# Patient Record
Sex: FEMALE | Race: WHITE | NOT HISPANIC OR LATINO
[De-identification: names, ages, dates, MRNs, and addresses within clinical notes are randomized per-mention and may not be internally consistent; named-entity substitution may affect disease eponyms.]

---

## 2021-10-14 ENCOUNTER — APPOINTMENT (OUTPATIENT)
Dept: OPHTHALMOLOGY | Facility: CLINIC | Age: 26
End: 2021-10-14
Payer: COMMERCIAL

## 2021-10-14 ENCOUNTER — NON-APPOINTMENT (OUTPATIENT)
Age: 26
End: 2021-10-14

## 2021-10-14 PROCEDURE — 92014 COMPRE OPH EXAM EST PT 1/>: CPT

## 2021-10-14 PROCEDURE — 99072 ADDL SUPL MATRL&STAF TM PHE: CPT

## 2022-05-06 ENCOUNTER — NON-APPOINTMENT (OUTPATIENT)
Age: 27
End: 2022-05-06

## 2022-05-07 PROBLEM — Z00.00 ENCOUNTER FOR PREVENTIVE HEALTH EXAMINATION: Status: ACTIVE | Noted: 2022-05-07

## 2023-02-09 ENCOUNTER — APPOINTMENT (OUTPATIENT)
Dept: PODIATRY | Facility: CLINIC | Age: 28
End: 2023-02-09
Payer: COMMERCIAL

## 2023-02-09 ENCOUNTER — FORM ENCOUNTER (OUTPATIENT)
Age: 28
End: 2023-02-09

## 2023-02-09 DIAGNOSIS — M79.89 OTHER SPECIFIED SOFT TISSUE DISORDERS: ICD-10-CM

## 2023-02-09 PROCEDURE — 99203 OFFICE O/P NEW LOW 30 MIN: CPT

## 2023-02-09 NOTE — PHYSICAL EXAM
[Left] : left foot and ankle [NL 30)] : inversion 30 degrees [NL (40)] : MTP joint DF 40 degrees [NL (20)] : MTP joint PF 20 degrees [5___] : [facility] 5[unfilled]/5 [2+] : posterior tibialis pulse: 2+ [Normal] : saphenous nerve sensation normal [] : non-antalgic [FreeTextEntry3] : GANGLION CYST LATERAL ASPECT OF ANKLE.

## 2023-02-09 NOTE — HISTORY OF PRESENT ILLNESS
[de-identified] : Patient presents today for evaluation of Left ankle pain. Patient states problem started in April 2022 when she went on a trip she noticed a bump on the side of her ankle. She states that she went to urgent care and was told that the bump was a cyst. The patient states since she noticed the bump it has decreased in size. Patient went to urgent care on 5/7/22 to have the bump evaluated. Patient was told to follow up with Dermatology however the patient states she did not.

## 2023-02-09 NOTE — ASSESSMENT
[FreeTextEntry1] : I AM ORDERING AN MRI. TO RULE OUT GANGLION OR LIPOMA. \par I DISCUSSED ASPIRATION BUT WILL WAIT. \par RTO AFTER MRI.

## 2023-03-13 ENCOUNTER — TRANSCRIPTION ENCOUNTER (OUTPATIENT)
Age: 28
End: 2023-03-13

## 2023-03-23 ENCOUNTER — APPOINTMENT (OUTPATIENT)
Dept: NEUROLOGY | Facility: CLINIC | Age: 28
End: 2023-03-23
Payer: COMMERCIAL

## 2023-03-23 VITALS
DIASTOLIC BLOOD PRESSURE: 89 MMHG | HEART RATE: 122 BPM | BODY MASS INDEX: 20.89 KG/M2 | SYSTOLIC BLOOD PRESSURE: 135 MMHG | HEIGHT: 66 IN | WEIGHT: 130 LBS

## 2023-03-23 DIAGNOSIS — Z80.52 FAMILY HISTORY OF MALIGNANT NEOPLASM OF BLADDER: ICD-10-CM

## 2023-03-23 DIAGNOSIS — R90.89 OTHER ABNORMAL FINDINGS ON DIAGNOSTIC IMAGING OF CENTRAL NERVOUS SYSTEM: ICD-10-CM

## 2023-03-23 DIAGNOSIS — F41.9 ANXIETY DISORDER, UNSPECIFIED: ICD-10-CM

## 2023-03-23 DIAGNOSIS — Z83.1 FAMILY HISTORY OF OTHER INFECTIOUS AND PARASITIC DISEASES: ICD-10-CM

## 2023-03-23 DIAGNOSIS — Z80.3 FAMILY HISTORY OF MALIGNANT NEOPLASM OF BREAST: ICD-10-CM

## 2023-03-23 DIAGNOSIS — Z80.1 FAMILY HISTORY OF MALIGNANT NEOPLASM OF TRACHEA, BRONCHUS AND LUNG: ICD-10-CM

## 2023-03-23 PROCEDURE — 99205 OFFICE O/P NEW HI 60 MIN: CPT

## 2023-03-23 RX ORDER — NORETHINDRONE ACETATE AND ETHINYL ESTRADIOL AND FERROUS FUMARATE 1MG-20(21)
1-20 KIT ORAL
Refills: 0 | Status: ACTIVE | COMMUNITY

## 2023-03-24 ENCOUNTER — LABORATORY RESULT (OUTPATIENT)
Age: 28
End: 2023-03-24

## 2023-03-24 PROBLEM — F41.9 ANXIETY: Status: ACTIVE | Noted: 2023-03-24

## 2023-03-24 RX ORDER — ALPRAZOLAM 0.5 MG/1
0.5 TABLET ORAL
Qty: 6 | Refills: 0 | Status: ACTIVE | COMMUNITY
Start: 2023-03-24 | End: 1900-01-01

## 2023-03-24 RX ORDER — CLONAZEPAM 0.5 MG/1
0.5 TABLET ORAL
Qty: 5 | Refills: 0 | Status: DISCONTINUED | COMMUNITY
Start: 2023-03-24 | End: 2023-03-24

## 2023-03-24 NOTE — ASSESSMENT
[FreeTextEntry1] : Assessment/Plan:\par  27 year old female with no significant past medical hx, recently developed new onset bilateral foot numbness which resolved in 2 weeks and work up revealed abnormal brain and spine imaging concerning for demyelinating process. I have personally reviewed MRI scans and agree that the findings appear characteristic for multiple sclerosis and meet DIS and DIT based on 2017 [employer] criteria for diagnosis of MS (ovoid, periventricular, juxtacortical, enhancing short segment cord lesion). She has an active cord lesion at T9, which most likely was cause of her symptoms. \par \par I discussed the pathophysiology of Multiple Sclerosis, its disease course and management. We discussed the different options for disease modifying therapy. I explained to her that the goal of treatment is to prevent any new clinical relapses, new lesions on MRI scans and to slow down disease progression/disability. In addition to discussing disease modifying therapies, we reviewed available therapies for symptomatic management for spasticity, neuropathic pain, bladder symptoms, fatigue etc. I also stressed on the importance of healthy eating habits, routine physical therapy and vitamin D supplementation.\par \par Multiple Sclerosis- will plan to order blood work up to rule out potential MS mimickers. Will also order preDMT labs. \par Plan: \par 1. Diagnostic Plan/Imaging: Will plan to repeat MRI brain, C/T spine w.w.o contrast 3 months after the start of DMT\par \par 2. Disease Modifying therapy plan: preDMT labs ordered\par \par 3. Symptomatic therapy plan:\par () Vitamin D3 and B12 supplementations \par \par \par \par Return to clinic 2-3 weeks, pt leaving for her honeymoon to [place] in 2 weeks. Okay to travel. \par \par The above plan was discussed with REANNA HOUSTON in great detail.  REANNA HOUSTON verbalized understanding and agrees with plan as detailed above. Patient was provided education and counselling on current diagnosis/symptoms. She was advised to call our clinic at [phone] for any new or worsening symptoms, or with any questions or concerns. In case of acute onset of neurological symptoms or worsening presentation, patient was advised to present to nearest emergency room for further evaluation. REANNA ROSEMARIE expressed understanding and all her questions/concerns were addressed.\par \par Nirali Talbert M.D\par

## 2023-03-24 NOTE — HISTORY OF PRESENT ILLNESS
[FreeTextEntry1] : HPI (initial visit Mar 23, 2023)- REANNA HOUSTON is a 27 year old woman referred for abnormal brain MRI and to rule out MS.\par \par She has been followed Dr. Santosh Rodriguez, neurologist. \par \par About 1 month ago (feb), flown back from [place], and woke up with tingling in feet, constant. Two weeks prior, she had an URI. She was seen at urgent care and seen at [facility], ruled out DVT, referred to neurology. Seen by sisters rheumatologist, had blood work up, took prednisone taper, symptoms have now resolved 100%. Saw Dr. Rodriguez 3/2/2023. NO prior hx of transient neurological symptoms. \par \par Labs by Rheumatologist, Dr. Slick Garay- B12 380, Vitamin D 47.2, C3 and C4 normal, PTH normal, thyroglobulin ab neg, quant TB neg, DsDNA neg, Beta 2 GP 1 aB (positive) and cardiolipin ab IGM 17.2 [H] - labs uploaded. Rheum plans on repeating abnormal labs in 3 months. \par \par Recent MRI imaging from [facility]-\par MRI brain w/w/o 3/8/2023- scattered ovoid supratentorial lesions, periventricular and juxtacortical, non enhancing. No definite CC lesion. \par MRI C spine w/w/o 3/17/2023- no cord lesions\par MRI T spine w/w/o 3/17/2023- central posterior cord lesion at T9, enhancing

## 2023-03-24 NOTE — PHYSICAL EXAM
[FreeTextEntry1] : PHYSICAL EXAM\par Constitutional: Alert, no acute distress \par Psychiatric: appropriate affect and mood\par Pulmonary: No respiratory distress, stable on room air\par \par NEUROLOGICAL EXAM\par Mental status: The patient is alert, attentive and conversational memory intact.\par Speech/language: No dysarthria\par Cranial nerves:\par CN II: Visual fields are full to confrontation. Pupil size equal and briskly reactive to light. \par CN III, IV, VI: EOMI, no nystagmus, no ptosis\par CN V: Facial sensation is intact to pinprick in all 3 divisions bilaterally.\par CN VII: Face is symmetric with normal eye closure and smile.\par CN VII: Hearing is normal to rubbing fingers\par CN IX, X: Palate elevates symmetrically. Phonation is normal.\par CN XI: Head turning and shoulder shrug are intact\par CN XII: Tongue is midline with normal movements and no atrophy.\par Motor: Strength is full bilaterally. 5/5 muscle power in bilateral UE and LE.\par Reflexes:                 R        L\par                  Biceps    3+       3+\par                  Patellar   3+       3+\par                  Achilles  4+       4+\par                  Plantar responses- R down\par                                                  L down\par Sensory:                          RUE/RLE         LUE/LLE\par                  light touch       +/+                     +/+\par                  Pinprick           + /+                     +/+\par                  Vibration         + /+                     +/+\par                  Joint PS          +/+                      +/+\par                  Temp              + /+                      +/+\par Coordination: There is no dysmetria on finger-to-nose and heel to shin. \par Gait/Stance: Posture is normal. Gait is steady with normal steps, base, arm swing, and turning. Tandem gait is normal. Romberg is negative.\par \par \par \par \par

## 2023-03-24 NOTE — DATA REVIEWED
[de-identified] : Recent MRI imaging from [facility]-\par MRI brain w/w/o 3/8/2023- scattered ovoid supratentorial lesions, periventricular and juxtacortical, non enhancing. No definite CC lesion. \par MRI C spine w/w/o 3/17/2023- no cord lesions\par MRI T spine w/w/o 3/17/2023- central posterior cord lesion at T9, enhancing

## 2023-03-27 LAB
ACE BLD-CCNC: 16 U/L
ALBUMIN SERPL ELPH-MCNC: 4.4 G/DL
ALP BLD-CCNC: 50 U/L
ALT SERPL-CCNC: 14 U/L
ANA PAT FLD IF-IMP: ABNORMAL
ANA SER IF-ACNC: ABNORMAL
AST SERPL-CCNC: 16 U/L
B BURGDOR AB SER-IMP: NEGATIVE
B BURGDOR IGG+IGM SER QL: 0.34 INDEX
BASOPHILS # BLD AUTO: 0.02 K/UL
BASOPHILS NFR BLD AUTO: 0.2 %
BILIRUB DIRECT SERPL-MCNC: 0.1 MG/DL
BILIRUB INDIRECT SERPL-MCNC: 0.2 MG/DL
BILIRUB SERPL-MCNC: 0.2 MG/DL
BUN SERPL-MCNC: 17 MG/DL
CREAT SERPL-MCNC: 0.89 MG/DL
CRP SERPL-MCNC: 6 MG/L
DEPRECATED KAPPA LC FREE/LAMBDA SER: 1.61 RATIO
DSDNA AB SER-ACNC: <12 IU/ML
EGFR: 91 ML/MIN/1.73M2
ENA RNP AB SER IA-ACNC: 0.2 AL
ENA SM AB SER IA-ACNC: <0.2 AL
ENA SS-A AB SER IA-ACNC: <0.2 AL
ENA SS-B AB SER IA-ACNC: <0.2 AL
EOSINOPHIL # BLD AUTO: 0.01 K/UL
EOSINOPHIL NFR BLD AUTO: 0.1 %
ERYTHROCYTE [SEDIMENTATION RATE] IN BLOOD BY WESTERGREN METHOD: 31 MM/HR
HBV CORE IGG+IGM SER QL: NONREACTIVE
HBV CORE IGM SER QL: NONREACTIVE
HBV SURFACE AB SER QL: REACTIVE
HBV SURFACE AG SER QL: NONREACTIVE
HCT VFR BLD CALC: 38.6 %
HGB BLD-MCNC: 12.7 G/DL
HIV1+2 AB SPEC QL IA.RAPID: NONREACTIVE
IGA SER QL IEP: 164 MG/DL
IGG SER QL IEP: 1239 MG/DL
IGM SER QL IEP: 238 MG/DL
IMM GRANULOCYTES NFR BLD AUTO: 0.3 %
KAPPA LC CSF-MCNC: 1.12 MG/DL
KAPPA LC SERPL-MCNC: 1.8 MG/DL
LYMPHOCYTES # BLD AUTO: 1.98 K/UL
LYMPHOCYTES NFR BLD AUTO: 22 %
MAN DIFF?: NORMAL
MCHC RBC-ENTMCNC: 29.9 PG
MCHC RBC-ENTMCNC: 32.9 GM/DL
MCV RBC AUTO: 90.8 FL
MONOCYTES # BLD AUTO: 0.46 K/UL
MONOCYTES NFR BLD AUTO: 5.1 %
NEUTROPHILS # BLD AUTO: 6.49 K/UL
NEUTROPHILS NFR BLD AUTO: 72.3 %
PLATELET # BLD AUTO: 305 K/UL
PROT SERPL-MCNC: 7.2 G/DL
RBC # BLD: 4.25 M/UL
RBC # FLD: 12.3 %
RHEUMATOID FACT SER QL: <10 IU/ML
T PALLIDUM AB SER QL IA: NEGATIVE
VZV AB TITR SER: POSITIVE
VZV IGG SER IF-ACNC: 475 INDEX
VZV IGM SER IF-ACNC: <0.91 INDEX
WBC # FLD AUTO: 8.99 K/UL

## 2023-03-28 LAB
B2 GLYCOPROT1 IGG SER-ACNC: <5 SGU
B2 GLYCOPROT1 IGM SER-ACNC: 10.9 SMU
CARDIOLIPIN IGM SER-MCNC: 12.9 MPL
CARDIOLIPIN IGM SER-MCNC: <5 GPL

## 2023-03-29 LAB
ANCA AB SER-IMP: NEGATIVE
C-ANCA SER-ACNC: NEGATIVE
M TB IFN-G BLD-IMP: NEGATIVE
P-ANCA TITR SER IF: NEGATIVE
QUANTIFERON TB PLUS MITOGEN MINUS NIL: >10 IU/ML
QUANTIFERON TB PLUS NIL: 0.01 IU/ML
QUANTIFERON TB PLUS TB1 MINUS NIL: 0 IU/ML
QUANTIFERON TB PLUS TB2 MINUS NIL: 0 IU/ML

## 2023-03-30 LAB
AQP4 H2O CHANNEL AB SERPL IA-ACNC: NEGATIVE
HTLV I+II AB SER QL: NORMAL
JCV INDEX: 0.18
MOG AB SER QL CBA IFA: NEGATIVE
STRATIFY JCV ANTIBODY: NEGATIVE

## 2023-04-01 ENCOUNTER — EMERGENCY (EMERGENCY)
Facility: HOSPITAL | Age: 28
LOS: 1 days | Discharge: HOME | End: 2023-04-01
Attending: EMERGENCY MEDICINE
Payer: COMMERCIAL

## 2023-04-01 VITALS
DIASTOLIC BLOOD PRESSURE: 73 MMHG | SYSTOLIC BLOOD PRESSURE: 106 MMHG | RESPIRATION RATE: 16 BRPM | HEART RATE: 88 BPM | TEMPERATURE: 99 F | OXYGEN SATURATION: 97 %

## 2023-04-01 VITALS
RESPIRATION RATE: 18 BRPM | WEIGHT: 130.07 LBS | HEART RATE: 122 BPM | DIASTOLIC BLOOD PRESSURE: 81 MMHG | SYSTOLIC BLOOD PRESSURE: 119 MMHG | TEMPERATURE: 98 F | OXYGEN SATURATION: 100 % | HEIGHT: 66 IN

## 2023-04-01 LAB
ALBUMIN SERPL ELPH-MCNC: 5.4 G/DL — HIGH (ref 3.3–5)
ALP SERPL-CCNC: 54 U/L — SIGNIFICANT CHANGE UP (ref 40–120)
ALT FLD-CCNC: 19 U/L — SIGNIFICANT CHANGE UP (ref 10–45)
AMPHET UR-MCNC: NEGATIVE — SIGNIFICANT CHANGE UP
ANION GAP SERPL CALC-SCNC: 12 MMOL/L — SIGNIFICANT CHANGE UP (ref 5–17)
APAP SERPL-MCNC: <15 UG/ML — SIGNIFICANT CHANGE UP (ref 10–30)
APPEARANCE UR: CLEAR — SIGNIFICANT CHANGE UP
APTT BLD: 29 SEC — SIGNIFICANT CHANGE UP (ref 27.5–35.5)
AST SERPL-CCNC: 17 U/L — SIGNIFICANT CHANGE UP (ref 10–40)
BACTERIA # UR AUTO: NEGATIVE — SIGNIFICANT CHANGE UP
BARBITURATES UR SCN-MCNC: NEGATIVE — SIGNIFICANT CHANGE UP
BASOPHILS # BLD AUTO: 0.01 K/UL — SIGNIFICANT CHANGE UP (ref 0–0.2)
BASOPHILS NFR BLD AUTO: 0.1 % — SIGNIFICANT CHANGE UP (ref 0–2)
BENZODIAZ UR-MCNC: NEGATIVE — SIGNIFICANT CHANGE UP
BILIRUB SERPL-MCNC: 0.4 MG/DL — SIGNIFICANT CHANGE UP (ref 0.2–1.2)
BILIRUB UR-MCNC: NEGATIVE — SIGNIFICANT CHANGE UP
BUN SERPL-MCNC: 9 MG/DL — SIGNIFICANT CHANGE UP (ref 7–23)
CALCIUM SERPL-MCNC: 10.4 MG/DL — SIGNIFICANT CHANGE UP (ref 8.4–10.5)
CHLORIDE SERPL-SCNC: 100 MMOL/L — SIGNIFICANT CHANGE UP (ref 96–108)
CO2 SERPL-SCNC: 24 MMOL/L — SIGNIFICANT CHANGE UP (ref 22–31)
COCAINE METAB.OTHER UR-MCNC: NEGATIVE — SIGNIFICANT CHANGE UP
COLOR SPEC: COLORLESS — SIGNIFICANT CHANGE UP
CREAT SERPL-MCNC: 0.82 MG/DL — SIGNIFICANT CHANGE UP (ref 0.5–1.3)
DIFF PNL FLD: ABNORMAL
EGFR: 100 ML/MIN/1.73M2 — SIGNIFICANT CHANGE UP
EOSINOPHIL # BLD AUTO: 0 K/UL — SIGNIFICANT CHANGE UP (ref 0–0.5)
EOSINOPHIL NFR BLD AUTO: 0 % — SIGNIFICANT CHANGE UP (ref 0–6)
EPI CELLS # UR: 1 /HPF — SIGNIFICANT CHANGE UP
ETHANOL SERPL-MCNC: <10 MG/DL — SIGNIFICANT CHANGE UP (ref 0–10)
FLUAV AG NPH QL: SIGNIFICANT CHANGE UP
FLUBV AG NPH QL: SIGNIFICANT CHANGE UP
GLUCOSE SERPL-MCNC: 92 MG/DL — SIGNIFICANT CHANGE UP (ref 70–99)
GLUCOSE UR QL: NEGATIVE — SIGNIFICANT CHANGE UP
HCG SERPL-ACNC: <2 MIU/ML — SIGNIFICANT CHANGE UP
HCT VFR BLD CALC: 45.8 % — HIGH (ref 34.5–45)
HGB BLD-MCNC: 15.4 G/DL — SIGNIFICANT CHANGE UP (ref 11.5–15.5)
HYALINE CASTS # UR AUTO: 1 /LPF — SIGNIFICANT CHANGE UP (ref 0–2)
IMM GRANULOCYTES NFR BLD AUTO: 0.3 % — SIGNIFICANT CHANGE UP (ref 0–0.9)
INR BLD: 1.03 RATIO — SIGNIFICANT CHANGE UP (ref 0.88–1.16)
KETONES UR-MCNC: NEGATIVE — SIGNIFICANT CHANGE UP
LEUKOCYTE ESTERASE UR-ACNC: NEGATIVE — SIGNIFICANT CHANGE UP
LYMPHOCYTES # BLD AUTO: 1.22 K/UL — SIGNIFICANT CHANGE UP (ref 1–3.3)
LYMPHOCYTES # BLD AUTO: 15.8 % — SIGNIFICANT CHANGE UP (ref 13–44)
MCHC RBC-ENTMCNC: 29.3 PG — SIGNIFICANT CHANGE UP (ref 27–34)
MCHC RBC-ENTMCNC: 33.6 GM/DL — SIGNIFICANT CHANGE UP (ref 32–36)
MCV RBC AUTO: 87.2 FL — SIGNIFICANT CHANGE UP (ref 80–100)
METHADONE UR-MCNC: NEGATIVE — SIGNIFICANT CHANGE UP
MONOCYTES # BLD AUTO: 0.25 K/UL — SIGNIFICANT CHANGE UP (ref 0–0.9)
MONOCYTES NFR BLD AUTO: 3.2 % — SIGNIFICANT CHANGE UP (ref 2–14)
NEUTROPHILS # BLD AUTO: 6.24 K/UL — SIGNIFICANT CHANGE UP (ref 1.8–7.4)
NEUTROPHILS NFR BLD AUTO: 80.6 % — HIGH (ref 43–77)
NITRITE UR-MCNC: NEGATIVE — SIGNIFICANT CHANGE UP
NRBC # BLD: 0 /100 WBCS — SIGNIFICANT CHANGE UP (ref 0–0)
OPIATES UR-MCNC: NEGATIVE — SIGNIFICANT CHANGE UP
OXYCODONE UR-MCNC: NEGATIVE — SIGNIFICANT CHANGE UP
PCP SPEC-MCNC: SIGNIFICANT CHANGE UP
PCP UR-MCNC: NEGATIVE — SIGNIFICANT CHANGE UP
PH UR: 6 — SIGNIFICANT CHANGE UP (ref 5–8)
PLATELET # BLD AUTO: 462 K/UL — HIGH (ref 150–400)
POTASSIUM SERPL-MCNC: 3.8 MMOL/L — SIGNIFICANT CHANGE UP (ref 3.5–5.3)
POTASSIUM SERPL-SCNC: 3.8 MMOL/L — SIGNIFICANT CHANGE UP (ref 3.5–5.3)
PROT SERPL-MCNC: 8.8 G/DL — HIGH (ref 6–8.3)
PROT UR-MCNC: NEGATIVE — SIGNIFICANT CHANGE UP
PROTHROM AB SERPL-ACNC: 12 SEC — SIGNIFICANT CHANGE UP (ref 10.5–13.4)
RBC # BLD: 5.25 M/UL — HIGH (ref 3.8–5.2)
RBC # FLD: 12 % — SIGNIFICANT CHANGE UP (ref 10.3–14.5)
RBC CASTS # UR COMP ASSIST: 1 /HPF — SIGNIFICANT CHANGE UP (ref 0–4)
RSV RNA NPH QL NAA+NON-PROBE: SIGNIFICANT CHANGE UP
SALICYLATES SERPL-MCNC: <2 MG/DL — LOW (ref 15–30)
SARS-COV-2 RNA SPEC QL NAA+PROBE: SIGNIFICANT CHANGE UP
SODIUM SERPL-SCNC: 136 MMOL/L — SIGNIFICANT CHANGE UP (ref 135–145)
SP GR SPEC: 1.01 — LOW (ref 1.01–1.02)
THC UR QL: NEGATIVE — SIGNIFICANT CHANGE UP
UROBILINOGEN FLD QL: NEGATIVE — SIGNIFICANT CHANGE UP
WBC # BLD: 7.74 K/UL — SIGNIFICANT CHANGE UP (ref 3.8–10.5)
WBC # FLD AUTO: 7.74 K/UL — SIGNIFICANT CHANGE UP (ref 3.8–10.5)
WBC UR QL: 1 /HPF — SIGNIFICANT CHANGE UP (ref 0–5)

## 2023-04-01 PROCEDURE — 99284 EMERGENCY DEPT VISIT MOD MDM: CPT | Mod: 25

## 2023-04-01 PROCEDURE — 36415 COLL VENOUS BLD VENIPUNCTURE: CPT

## 2023-04-01 PROCEDURE — 84702 CHORIONIC GONADOTROPIN TEST: CPT

## 2023-04-01 PROCEDURE — 80307 DRUG TEST PRSMV CHEM ANLYZR: CPT

## 2023-04-01 PROCEDURE — 85610 PROTHROMBIN TIME: CPT

## 2023-04-01 PROCEDURE — 85025 COMPLETE CBC W/AUTO DIFF WBC: CPT

## 2023-04-01 PROCEDURE — 93005 ELECTROCARDIOGRAM TRACING: CPT

## 2023-04-01 PROCEDURE — 80053 COMPREHEN METABOLIC PANEL: CPT

## 2023-04-01 PROCEDURE — 85730 THROMBOPLASTIN TIME PARTIAL: CPT

## 2023-04-01 PROCEDURE — 81001 URINALYSIS AUTO W/SCOPE: CPT

## 2023-04-01 PROCEDURE — 87637 SARSCOV2&INF A&B&RSV AMP PRB: CPT

## 2023-04-01 NOTE — ED PROVIDER NOTE - CARE PLAN
Assessment and plan of treatment:	see mdm   Principal Discharge DX:	Eye pressure  Assessment and plan of treatment:	see mdm  Secondary Diagnosis:	Blurred vision   1

## 2023-04-01 NOTE — ED PROVIDER NOTE - PROGRESS NOTE DETAILS
Neurology consulted and spoke with indicating patient is having migraine and is okay to be D/C. Will educate patient on use of advil, tyenol to control migraine's and thusly visual symptoms. ALso send home on steriod medication for 5 days and to f/u with outpatient Dr. Benavidez. Neurology consulted (added UA/UC/U-tox, serum tox.) and spoke with indicating patient is having migraine and is okay to be D/C. Will educate patient on use of advil, tyenol to control migraine's and thusly visual symptoms. ALso send home on steriod medication for 5 days and to f/u with outpatient Dr. Benavidez.

## 2023-04-01 NOTE — ED ADULT NURSE NOTE - OBJECTIVE STATEMENT
27y f pt with mother at bedside c/o blurry vision, eye pressure, dilated pupils and floaters that have resolved; states started wednesday; pt recently dx with ms; doctor that dx her told her to go to ed; had been on steroids that ended in early march; aox3; no resp distress; no sob; no chest pain; pt ambulating with steady gait on own without assist; pt speaking in full sentences; no abd pain; no n/v/d; denies fever/chills; no cough/congestion; iv placed; labs drawn/sent

## 2023-04-01 NOTE — ED PROVIDER NOTE - CARE PROVIDER_API CALL
Nirali Talbert)  Neurology  [address] [place] [address]  Phone: [phone]  Fax: [phone]  Follow Up Time:

## 2023-04-01 NOTE — ED PROVIDER NOTE - NS ED ROS FT
REVIEW OF SYSTEMS:    CONSTITUTIONAL: +headache, No weakness, fevers or chills  EYES/ENT: + bilateral eye pressure, blurry vision, floaters, pupil dilation no loss of vision or eye pain  NECK: No pain or stiffness  RESPIRATORY: No cough, wheezing, hemoptysis; No shortness of breath  CARDIOVASCULAR: No chest pain or palpitations  GASTROINTESTINAL: No abdominal or epigastric pain. No nausea, vomiting, or hematemesis; No diarrhea or constipation. No melena or hematochezia.  GENITOURINARY: No dysuria, frequency or hematuria  NEUROLOGICAL: No numbness or weakness  SKIN: No itching, rashes

## 2023-04-01 NOTE — ED PROVIDER NOTE - NSFOLLOWUPINSTRUCTIONS_ED_ALL_ED_FT
You were evaluated in the emergency department for visual changes. Your results were discussed with you and are attached. Please follow up with neurology Dr. Nirali Talbert on an outpatient basis. A steroid medication has been sent to your pharmacy - please pickup and take as directed. Also follow up with your primary care doctor within 1 week.    Rest, drink plenty of fluids.  Advance activity as tolerated.  Continue all previously prescribed medications as directed.  Follow up with your primary care physician in 48-72 hours- bring copies of your results.  Return to the ER for worsening or persistent symptoms, and/or ANY NEW OR CONCERNING SYMPTOMS. If you have issues obtaining follow up, please call: [phone] to obtain a doctor or specialist who takes your insurance in your area.

## 2023-04-01 NOTE — CONSULT NOTE ADULT - SUBJECTIVE AND OBJECTIVE BOX
Neurology - Consult Note    -  Spectra: [phone] ([facility]), [phone] ([facility])  -    HPI: Patient REANNA HOUSTON is a 27y (1995) woman with a PMHx significant for recent MS diagnosis presenting with visual sx followed by HA. On wednesday, patient saw black streaks and immediately had a bifrontal HA. Visual sx lasted for 5-10 min. HA was associated with nausea, and lasted for a couple of hours. Patient took advil and sat in a dark, cool room. Patient has not been formally dx with migraines but does get b/l frontal HA every month, and notices them to be more frequent before menstrual cycle. Of note, patient says she is due to get her menstrual cycle in one week. Patient says she wears contacts, but decided to wear glasses. Her glasses have the wrong prescription, which is lower, so after wearing her glasses, patient felt like she had b/l eye pressure. Denies weakness, numbness, dizziness, vision loss, blurry vision, eye pain, color distortions. On OCP for a while. Recently completed steroid taper per rheumatology.     Of note, patient saw Dr. Talbert on 3/24/23. Patient presented initially with tingling in feet that resolved in 2 weeks. Below imaging, revealed c/f demyelinating process suggestive of MS. Her T9 cord lesion was likely cause of her tingling in the feet. The plan was to repeat neuroimaging 3 months after the start of DMT. Patient currently not on DMT.     MRI brain w/w/o 3/8/2023- scattered ovoid supratentorial lesions, periventricular and juxtacortical, non enhancing. No definite CC lesion.   MRI C spine w/w/o 3/17/2023- no cord lesions  MRI T spine w/w/o 3/17/2023- central posterior cord lesion at T9, enhancing     Patient also had labs drawn by rheumatology which were s/f positive Beta 2 GP 1 ab and cardiolipin ab IgM. Rheumatology planned to repeat these labs in 3 months and gave the patient the above steroid taper.       Review of Systems:    All other review of systems is negative unless indicated above.    Allergies:  No Known Allergies      PMHx/PSHx/Family Hx: As above, otherwise see below   Multiple sclerosis        Social Hx:  No current use of tobacco, alcohol, or illicit drugs    Medications:  MEDICATIONS  (STANDING):    MEDICATIONS  (PRN):      Vitals:  T(C): 37.1 (04-01-23 @ 14:00), Max: 37.1 (04-01-23 @ 14:00)  HR: 88 (04-01-23 @ 14:00) (88 - 122)  BP: 106/73 (04-01-23 @ 14:00) (106/73 - 119/81)  RR: 16 (04-01-23 @ 14:00) (16 - 18)  SpO2: 97% (04-01-23 @ 14:00) (97% - 100%)    Physical Examination:   General - NAD  Cardiovascular - Peripheral pulses palpable, no edema  Eyes - Fundoscopy not performed due to safety precautions in the setting of the COVID-19 pandemic    Neurologic Exam:  Mental status - Awake, Alert, Oriented to person, place, and time. Speech fluent, repetition and naming intact. Follows simple and complex commands. Attention/concentration, recent and remote memory (including registration and recall), and fund of knowledge intact    Cranial nerves - PERRLA, VFF, EOMI, face sensation (V1-V3) intact b/l, facial strength intact without asymmetry b/l, hearing intact b/l, palate with symmetric elevation, trapezius 5/5 strength b/l, tongue midline on protrusion with full lateral movement    Motor - Normal bulk and tone throughout. No pronator drift.  Strength testing            Deltoid      Biceps      Triceps     Wrist Extension    Wrist Flexion     Interossei         R            5                 5               5                     5                              5                        5                 5  L             5                 5               5                     5                              5                        5                 5              Hip Flexion    Hip Extension    Knee Flexion    Knee Extension    Dorsiflexion    Plantar Flexion  R              5                           5                       5                           5                            5                          5  L              5                           5                        5                           5                            5                          5    Sensation - Light touch/temperature intact throughout    DTR's -             Biceps      Triceps     Brachioradialis      Patellar    Ankle    Toes/plantar response  R             3+             3+                  3+              3+            3+                 Down  L              3+             3+                 3+               3+           3+                 Down    Coordination - Finger to Nose intact b/l. No tremors appreciated    Gait and station - Normal casual gait.     Labs:          CAPILLARY BLOOD GLUCOSE              CSF:                  Radiology:

## 2023-04-01 NOTE — ED PROVIDER NOTE - OBJECTIVE STATEMENT
26yo F with PMH of recent MS diagnosis presenting to the ED with acute blurry vision and eye pressure bilaterally without pain for 1 day. Patient notes on Wed she noticed floaters in her vision, "black spots" alongside uilateral headache. Symptoms self resolved but on friday she began having blurry vision bilaterally, felt an increase in eye pressure, and indicates her pupils are dilated but denies pressure, trauma, N/V, vison loss, current headache. 26yo F with PMH of recent MS diagnosis presenting to the ED with acute blurry vision and eye pressure bilaterally without pain for 1 day. Patient notes on Wed she noticed floaters in her vision, "black spots" alongside unilateral headache. Symptoms self resolved but on Friday she began having blurry vision bilaterally, felt an increase in eye pressure, and indicates her pupils are dilated but denies pressure, trauma, N/V, vison loss, current headache.

## 2023-04-01 NOTE — ED PROVIDER NOTE - PHYSICAL EXAMINATION
VITALS:   T(C): 36.7 (04-01-23 @ 11:48), Max: 36.7 (04-01-23 @ 11:48)  HR: 122 (04-01-23 @ 11:48) (122 - 122)  BP: 119/81 (04-01-23 @ 11:48) (119/81 - 119/81)  RR: 18 (04-01-23 @ 11:48) (18 - 18)  SpO2: 100% (04-01-23 @ 11:48) (100% - 100%)    GENERAL: NAD, lying in bed comfortably  HEAD:  Atraumatic, Normocephalic  EYES: EOMI, PERRLA, conjunctiva and sclera clear, tonometry 19 measured bilaterally, visual acuity 20/20 bilaterally  ENT: Moist mucous membranes  NECK: Supple, No JVD  CHEST/LUNG: Clear to auscultation bilaterally; No rales, rhonchi, wheezing, or rubs. Unlabored respirations  HEART: Regular rate and rhythm; No murmurs. no lower extremity edema  ABDOMEN: BSx4; Soft, nontender, nondistended  EXTREMITIES:  2+ radial pulses, 2+ dorsalis pedis, brisk capillary refill. No clubbing, cyanosis, or edema  NERVOUS SYSTEM:  A&Ox3, no gross lateralizing deficits, bilateral normal sensation, CN2-12 intact, bilateral upper and lower extremity strength 5/5  SKIN: No rashes or lesions

## 2023-04-01 NOTE — CONSULT NOTE ADULT - ASSESSMENT
REANNA HOUSTON is a 27y (1995) woman with a PMHx significant for recent MS diagnosis presenting with visual sx followed by HA. On wednesday, patient saw black streaks and immediately had a bifrontal HA. Visual sx lasted for 5-10 min. HA was associated with nausea, and lasted for a couple of hours. Patient took advil and sat in a dark, cool room. Patient has not been formally dx with migraines but does get b/l frontal HA every month, and notices them to be more frequent before menstrual cycle. Of note, patient says she is due to get her menstrual cycle in one week. Patient says she wears contacts, but decided to wear glasses. Her glasses have the wrong prescription, which is lower, so after wearing her glasses, patient felt like she had b/l eye pressure. Denies weakness, numbness, dizziness, vision loss, blurry vision, eye pain, color distortions. On OCP for a while. Recently completed steroid taper per rheumatology.     Of note, patient saw Dr. Talbert on 3/24/23. Patient presented initially with tingling in feet that resolved in 2 weeks. Below imaging, revealed c/f demyelinating process suggestive of MS. Her T9 cord lesion was likely cause of her tingling in the feet. The plan was to repeat neuroimaging 3 months after the start of DMT. Patient currently not on DMT.     MRI brain w/w/o 3/8/2023- scattered ovoid supratentorial lesions, periventricular and juxtacortical, non enhancing. No definite CC lesion.   MRI C spine w/w/o 3/17/2023- no cord lesions  MRI T spine w/w/o 3/17/2023- central posterior cord lesion at T9, enhancing     Patient also had labs drawn by rheumatology which were s/f positive Beta 2 GP 1 ab and cardiolipin ab IgM. Rheumatology planned to repeat these labs in 3 months and gave the patient the above steroid taper.     Impression: Visual distortions followed by HA likely migraine with aura. Less likely MS flare given nonfocal symptoms and symptom presentation. Currently, patient denies all sx including HA.    Recommendations:   []No neurological contraindications to discharge  []HA control with Advil, Tylenol, or Excedrin  []If HA sx not controlled, would give patient 4mg Medrol dose pack for second line HA management   []F/u with Dr. Nirali Talbert outpatient.     Case d/w Dr. Nirali Talbert.

## 2023-04-01 NOTE — ED PROVIDER NOTE - PATIENT PORTAL LINK FT
You can access the FollowMyHealth Patient Portal offered by [facility] by registering at the following website: [email or web address]. By joining [employer]’s FollowMyHealth portal, you will also be able to view your health information using other applications (apps) compatible with our system.

## 2023-04-01 NOTE — CONSULT NOTE ADULT - NSCONSULTADDITIONALINFOA_GEN_ALL_CORE
ATTENDING ATTESTATION:    CASE WAS DISCUSSED WITH ME OVER THE PHONE. I DID NOT SEE PATIENT IN ER.  PT IS KNOWN TO ME- RECENTLY DIAGNOSED WITH MS.   I AGREE WITH ABOVE ASSESSMENT AND PLAN- PRESENTATION MOST LIKELY CONSISTENT WITH MIGRAINE AURA. MS RELAPSE LESS LIKELY.

## 2023-04-01 NOTE — ED PROVIDER NOTE - CLINICAL SUMMARY MEDICAL DECISION MAKING FREE TEXT BOX
28yo F with PMH of recent MS diagnosis presenting to the ED with acute blurry vision and eye pressure bilaterally without pain for 1 day. Patient without pain, transient floaters, dilated pupils, and blurry vision bilaterally. VSS outside tachycardia. Physical exam including neuro wnl, tonometry 19 bilaterally, acuity 20/20 bilaterally.     diff: most likely MS relapse however unusually based on patient bilateral nature of history. Considered glaucoma but in setting of normal tonometry less likely. Considered retinal detachment however based on bilateral nature also unusual. However in setting of potential retina detachment.    plan: CBC,CMP, neuro and optho consult with likely MRI. 26yo F with PMH of recent MS diagnosis presenting to the ED with acute blurry vision and eye pressure bilaterally without pain for 1 day. Patient without pain, transient floaters, dilated pupils, and blurry vision bilaterally. VSS outside tachycardia. Physical exam including neuro wnl, tonometry 19 bilaterally, acuity 20/20 bilaterally.     diff: most likely MS relapse however unusually based on patient bilateral nature of history. Considered glaucoma but in setting of normal tonometry less likely. Considered retinal detachment however based on bilateral nature also unusual. However in setting of potential retina detachment.    plan: CBC,CMP, neuro consult with likely MRI. SPoke with neurology added UA/UC/U-tox, serum tox. 26yo F with PMH of recent MS diagnosis presenting to the ED with acute blurry vision and eye pressure bilaterally without pain for 1 day. Patient without pain, transient floaters, dilated pupils, and blurry vision bilaterally. VSS outside tachycardia. Physical exam including neuro wnl, tonometry 19 bilaterally, acuity 20/20 bilaterally.     diff: most likely MS relapse however unusual based on patient bilateral nature of history. Considered glaucoma but in setting of normal tonometry less likely. Considered retinal detachment however based on bilateral nature also unusual and no curtain pattern or otherwise vision loss.     plan: CBC,CMP, neuro consult

## 2023-04-01 NOTE — ED PROVIDER NOTE - ATTENDING CONTRIBUTION TO CARE
Attending MD Harrison:  I personally have seen and examined this patient. I have performed a substantive portion of the visit including all aspects of the medical decision making.  Resident note reviewed and agree on plan of care and except where noted.          27-year-old woman with a history of recent MS diagnosis presenting with intermittent blurred vision of bilateral eyes as well as pressure behind both eyes.  No vision loss.  Denies any extremity paresthesias or weakness.  The patient was previously on a steroid course and no longer is because she completed the course.  The patient has not started a controller medication for her MS as of yet because she is to have a discussion with her neurologist about this.    Vital signs are nonactionable.  Patient sitting in the stretcher well-appearing in no distress.  Awake and alert. Symmetric eyebrow raise, symmetric eyelid closure. PERRL b/l, EOMI b/l, symmetric smile, tongue midline.  5/5  strength bilaterally, 5/5 elbow flexion bilaterally, 5/5 elbow extension bilaterally, 5/5 shoulder shrug b/l.  5/5 plantar and dorsiflexion b/l, 5/5 knee flexion and extension b/l, 5/5 hip flexion and extension b/l. Sensation intact and symmetric grossly to light touch throughout face and bilateral upper and lower extremities,  Finger to nose normal bilaterally. Steady gait.  Visual fields are grossly intact to confrontational testing bilaterally    Patient presenting with bilateral eye pressure and intermittent blurred vision.  Differential diagnosis includes but is not limited to possible optic neuritis related to MS diagnosis MS plaques in the occipital lobe.  She has a nonfocal neurologic exam at this time so urgent CT neuroimaging would be of little value at this time.  We will consult neurology to see if MS flare is to be considered and how to proceed if that is in the differential.        *The above represents an initial assessment/impression. Please refer to progress notes for potential changes in patient clinical course*

## 2023-04-04 ENCOUNTER — NON-APPOINTMENT (OUTPATIENT)
Age: 28
End: 2023-04-04

## 2023-04-04 ENCOUNTER — INPATIENT (INPATIENT)
Facility: HOSPITAL | Age: 28
LOS: 3 days | Discharge: HOME | DRG: 60 | End: 2023-04-08
Attending: PSYCHIATRY & NEUROLOGY | Admitting: PSYCHIATRY & NEUROLOGY
Payer: COMMERCIAL

## 2023-04-04 VITALS
HEIGHT: 66 IN | RESPIRATION RATE: 16 BRPM | DIASTOLIC BLOOD PRESSURE: 83 MMHG | HEART RATE: 93 BPM | WEIGHT: 130.07 LBS | OXYGEN SATURATION: 95 % | TEMPERATURE: 99 F | SYSTOLIC BLOOD PRESSURE: 120 MMHG

## 2023-04-04 LAB
ALBUMIN SERPL ELPH-MCNC: 4.9 G/DL — SIGNIFICANT CHANGE UP (ref 3.3–5)
ALP SERPL-CCNC: 47 U/L — SIGNIFICANT CHANGE UP (ref 40–120)
ALT FLD-CCNC: 14 U/L — SIGNIFICANT CHANGE UP (ref 10–45)
ANION GAP SERPL CALC-SCNC: 14 MMOL/L — SIGNIFICANT CHANGE UP (ref 5–17)
AST SERPL-CCNC: 15 U/L — SIGNIFICANT CHANGE UP (ref 10–40)
BASOPHILS # BLD AUTO: 0.01 K/UL — SIGNIFICANT CHANGE UP (ref 0–0.2)
BASOPHILS NFR BLD AUTO: 0.1 % — SIGNIFICANT CHANGE UP (ref 0–2)
BILIRUB SERPL-MCNC: 0.3 MG/DL — SIGNIFICANT CHANGE UP (ref 0.2–1.2)
BUN SERPL-MCNC: 10 MG/DL — SIGNIFICANT CHANGE UP (ref 7–23)
CALCIUM SERPL-MCNC: 9.8 MG/DL — SIGNIFICANT CHANGE UP (ref 8.4–10.5)
CHLORIDE SERPL-SCNC: 100 MMOL/L — SIGNIFICANT CHANGE UP (ref 96–108)
CO2 SERPL-SCNC: 25 MMOL/L — SIGNIFICANT CHANGE UP (ref 22–31)
CREAT SERPL-MCNC: 0.81 MG/DL — SIGNIFICANT CHANGE UP (ref 0.5–1.3)
EGFR: 102 ML/MIN/1.73M2 — SIGNIFICANT CHANGE UP
EOSINOPHIL # BLD AUTO: 0 K/UL — SIGNIFICANT CHANGE UP (ref 0–0.5)
EOSINOPHIL NFR BLD AUTO: 0 % — SIGNIFICANT CHANGE UP (ref 0–6)
FLUAV AG NPH QL: SIGNIFICANT CHANGE UP
FLUBV AG NPH QL: SIGNIFICANT CHANGE UP
GLUCOSE SERPL-MCNC: 94 MG/DL — SIGNIFICANT CHANGE UP (ref 70–99)
HCT VFR BLD CALC: 43.7 % — SIGNIFICANT CHANGE UP (ref 34.5–45)
HGB BLD-MCNC: 14.8 G/DL — SIGNIFICANT CHANGE UP (ref 11.5–15.5)
IMM GRANULOCYTES NFR BLD AUTO: 0.5 % — SIGNIFICANT CHANGE UP (ref 0–0.9)
LYMPHOCYTES # BLD AUTO: 2.79 K/UL — SIGNIFICANT CHANGE UP (ref 1–3.3)
LYMPHOCYTES # BLD AUTO: 24.7 % — SIGNIFICANT CHANGE UP (ref 13–44)
MCHC RBC-ENTMCNC: 30 PG — SIGNIFICANT CHANGE UP (ref 27–34)
MCHC RBC-ENTMCNC: 33.9 GM/DL — SIGNIFICANT CHANGE UP (ref 32–36)
MCV RBC AUTO: 88.6 FL — SIGNIFICANT CHANGE UP (ref 80–100)
MONOCYTES # BLD AUTO: 0.55 K/UL — SIGNIFICANT CHANGE UP (ref 0–0.9)
MONOCYTES NFR BLD AUTO: 4.9 % — SIGNIFICANT CHANGE UP (ref 2–14)
NEUTROPHILS # BLD AUTO: 7.88 K/UL — HIGH (ref 1.8–7.4)
NEUTROPHILS NFR BLD AUTO: 69.8 % — SIGNIFICANT CHANGE UP (ref 43–77)
NRBC # BLD: 0 /100 WBCS — SIGNIFICANT CHANGE UP (ref 0–0)
PLATELET # BLD AUTO: 483 K/UL — HIGH (ref 150–400)
POTASSIUM SERPL-MCNC: 3.5 MMOL/L — SIGNIFICANT CHANGE UP (ref 3.5–5.3)
POTASSIUM SERPL-SCNC: 3.5 MMOL/L — SIGNIFICANT CHANGE UP (ref 3.5–5.3)
PROT SERPL-MCNC: 8.1 G/DL — SIGNIFICANT CHANGE UP (ref 6–8.3)
RBC # BLD: 4.93 M/UL — SIGNIFICANT CHANGE UP (ref 3.8–5.2)
RBC # FLD: 12.2 % — SIGNIFICANT CHANGE UP (ref 10.3–14.5)
RSV RNA NPH QL NAA+NON-PROBE: SIGNIFICANT CHANGE UP
SARS-COV-2 RNA SPEC QL NAA+PROBE: SIGNIFICANT CHANGE UP
SODIUM SERPL-SCNC: 139 MMOL/L — SIGNIFICANT CHANGE UP (ref 135–145)
WBC # BLD: 11.29 K/UL — HIGH (ref 3.8–10.5)
WBC # FLD AUTO: 11.29 K/UL — HIGH (ref 3.8–10.5)

## 2023-04-04 RX ORDER — SODIUM CHLORIDE 9 MG/ML
1000 INJECTION INTRAMUSCULAR; INTRAVENOUS; SUBCUTANEOUS ONCE
Refills: 0 | Status: COMPLETED | OUTPATIENT
Start: 2023-04-04 | End: 2023-04-04

## 2023-04-04 RX ADMIN — SODIUM CHLORIDE 1000 MILLILITER(S): 9 INJECTION INTRAMUSCULAR; INTRAVENOUS; SUBCUTANEOUS at 18:30

## 2023-04-04 NOTE — ED PROVIDER NOTE - ATTENDING CONTRIBUTION TO CARE
RGUJRAL 27-year-old female with recent diagnosis of multiple sclerosis about a month ago recent admission to the ER for blurry vision on Saturday now returns with persistent symptoms of blurred vision and right lower extremity weakness since Sunday.  Patient states she feels pressure in both of her eyes was treated for optic migraine on Saturday and discharged home.  Patient has had persistent symptoms and now with right lower extremity weakness. She spoke to her neurologist Dr. Nirali Banegas who advised her to come in today.  Patient denies any headache nausea vomiting.  No numbness or tingling or change in bladder or bowel.  On exam patient is well-appearing no acute distress.  Normocephalic atraumatic pupils equal round and reactive to light.  No APD noted.  Lungs clear to auscultation positive S1-S2.  Abdomen soft nontender.  No midline T or L-spine tender to palpation.  Right lower extremity 5 out of 5 left lower extremity 5 out of 5 strength.  On gait assessment patient noted to have right foot dragging.  Will obtain labs neurology consult and ophthalmology consult to evaluate for MS flare and optic neuritis.

## 2023-04-04 NOTE — ED CDU PROVIDER INITIAL DAY NOTE - NS ED ATTENDING STATEMENT MOD
This was a shared visit with the AFSHIN. I reviewed and verified the documentation and independently performed the documented:

## 2023-04-04 NOTE — ED CDU PROVIDER INITIAL DAY NOTE - ATTENDING APP SHARED VISIT CONTRIBUTION OF CARE
RGUJRAL 27-year-old female with recent diagnosis of multiple sclerosis about a month ago recent admission to the ER for blurry vision on Saturday now returns with persistent symptoms of blurred vision and right lower extremity weakness since Sunday.  Patient states she feels pressure in both of her eyes was treated for optic migraine on Saturday and discharged home.  Patient has had persistent symptoms and now with right lower extremity weakness. She spoke to her neurologist Dr. Nirali Banegas who advised her to come in today.  Patient denies any headache nausea vomiting.  No numbness or tingling or change in bladder or bowel.  On exam patient is well-appearing no acute distress.  Normocephalic atraumatic pupils equal round and reactive to light.  No APD noted.  Lungs clear to auscultation positive S1-S2.  Abdomen soft nontender.  No midline T or L-spine tender to palpation.  Right lower extremity 5 out of 5 left lower extremity 5 out of 5 strength.  On gait assessment patient noted to have right foot dragging.  Results and Neurology consult appreciated. Admit to CDU at this time for monitoring and MRI. No IV steroids at this time. Ophthalmology consult pending to be seen in CDU.

## 2023-04-04 NOTE — ED CDU PROVIDER INITIAL DAY NOTE - OBJECTIVE STATEMENT
26 yo F with PMH of MS presenting with c/o worsening weakness and persistent eye pain and HA. PT follows with Dr. Talbert and was told to come to the ED. She is not currently on any medications. Pt denies nausea, vomiting, diarrhea, CP, dyspnea, cough, fever, chills, abd pain. Pt has a concern of a new foot drag. 28 yo F PMHx significant for recently diagnosed multiple sclerosis presents to the ED with visual blurriness and right sided weakness and gait instability. She follows with Dr. Nirali Talbert who recently diagnosed her with multiple sclerosis in 3/23/2023. [facility], " She has been followed Dr. Santosh Rodriguez, neurologist. About 1 month ago (feb), flown back from [place], and woke up with tingling in feet, constant. Two weeks prior, she had an URI. She was seen at urgent care and seen at [facility], ruled out DVT, referred to neurology. Seen by sisters rheumatologist, had blood work up, took prednisone taper, symptoms have now resolved 100%. Saw Dr. Rodriguez 3/2/2023. NO prior hx of transient neurological symptoms.  Recent MRI imaging from [facility]- MRI brain w/w/o 3/8/2023- scattered ovoid supratentorial lesions, periventricular and juxtacortical, non enhancing. No definite CC lesion. MRI C spine w/w/o 3/17/2023- no cord lesions. MRI T spine w/w/o 3/17/2023- central posterior cord lesion at T9, enhancing. Patient was planned for DMT after her honeymoon on 4/7 but patient presented to the hospital on 4/1 complaining of ocular blurriness and bifrontal headaches. She described it as her eyeballs being squeezed together. She was diagnosed with migraines w/ aura and less likely MS flare given nonfocal symptoms. She was provided a medrol pack and sent home. On 4/2, the patient was noticing difficulties with writing in her right hand and with right leg weakness and gait instability. No Lhermitte sign noted. Given persistence of symptoms, the patient's family called Dr. Talbert who recommended the patient come to the ED. Neurology consulted for blurry vision and right sided weakness. 26 yo F PMHx significant for recently diagnosed multiple sclerosis presents to the ED with visual blurriness and right sided weakness and gait instability. She follows with Dr. Nirali Talbert who recently diagnosed her with multiple sclerosis in 3/23/2023. Pt complaining of ocular blurriness and bifrontal headaches described it as her eyeballs being squeezed together. She was evaluated on 04/01 and diagnosed with migraines w/ aura and less likely MS flare given nonfocal symptoms. She was provided a medrol pack and sent home. On 4/2, the patient was noticing difficulties with writing in her right hand and with right leg weakness and gait instability. Given persistence of symptoms, the patient's family called Dr. Talbert who recommended the patient come to the ED.   In ED, patient had non actionable labs. Pt evaluated by Neurology who recommended CDU for MRI w/wo of orbits, brain, and cervical spine. For multiple sclerosis protocol.

## 2023-04-04 NOTE — ED CDU PROVIDER DISPOSITION NOTE - ATTENDING APP SHARED VISIT CONTRIBUTION OF CARE
Attending Note -- Patient seen and evaluated in CDU.  Labs/imaging reviewed.  Neuro recs appreciated.  Patient to be admitted to neuro for MS flare with active lesions on MR.  Tx per neuro, will c/t monitor.  --BMM

## 2023-04-04 NOTE — ED PROVIDER NOTE - PROGRESS NOTE DETAILS
Cristian, PGY-2, EM: neuro requested MRI, they think this is unlikely related to her MS. requested ophtho be on board. Cristian, PGY-2, EM: Ophtho consulted, they will come evaluate the pt in the ED. CDU aware of patient. unsure of they have beds currently, requested a call back to see after sign out. Neuro recs appreciated, discussed case with Neuro resident who states he spoke to Dr. Benavidez and does not recommend started IV steroids at this time and recommend CDU for MRI. Will consult optho to eval for optic neuritis. [email or web address]

## 2023-04-04 NOTE — ED ADULT NURSE NOTE - OBJECTIVE STATEMENT
27 year old female comes with Mom at bedside  For worsening symptoms that she was seen on Friday Pt states she has B/L Pressure In her eyes not getting better and right leg weakness Pt new dx of MS and sees a neurologist who told her to come back to the ED Pt was offered to stay on Friday for an MRI

## 2023-04-04 NOTE — ED CDU PROVIDER DISPOSITION NOTE - CLINICAL COURSE
26 yo F PMHx significant for recently diagnosed multiple sclerosis presents to the ED with visual blurriness and right sided weakness and gait instability. She follows with Dr. Nirali Talbert who recently diagnosed her with multiple sclerosis in 3/23/2023. Pt complaining of ocular blurriness and bifrontal headaches described it as her eyeballs being squeezed together. She was evaluated on 04/01 and diagnosed with migraines w/ aura and less likely MS flare given nonfocal symptoms. She was provided a medrol pack and sent home. On 4/2, the patient was noticing difficulties with writing in her right hand and with right leg weakness and gait instability. Given persistence of symptoms, the patient's family called Dr. Talbert who recommended the patient come to the ED.   In ED, patient had non actionable labs. Pt evaluated by Neurology who recommended CDU for MRI w/wo of orbits, brain, and cervical spine. For multiple sclerosis protocol. 28 yo F PMHx significant for recently diagnosed multiple sclerosis presents to the ED with visual blurriness and right sided weakness and gait instability. She follows with Dr. Nirali Talbert who recently diagnosed her with multiple sclerosis in 3/23/2023. Pt complaining of ocular blurriness and bifrontal headaches described it as her eyeballs being squeezed together. She was evaluated on 04/01 and diagnosed with migraines w/ aura and less likely MS flare given nonfocal symptoms. She was provided a medrol pack and sent home. On 4/2, the patient was noticing difficulties with writing in her right hand and with right leg weakness and gait instability. Given persistence of symptoms, the patient's family called Dr. Talbert who recommended the patient come to the ED.   In ED, patient had non actionable labs. Pt evaluated by Neurology who recommended CDU for MRI w/wo of orbits, brain, and cervical spine. For multiple sclerosis protocol.  in cdu, MRI significant for active MS lesions- neuro admitted for IV steroids

## 2023-04-04 NOTE — ED PROVIDER NOTE - CLINICAL SUMMARY MEDICAL DECISION MAKING FREE TEXT BOX
26 yo F with PMH of MS presenting with c/o worsening weakness and persistent eye pain and HA. Differential diagnosis includes but is not limited to MS flare, complex migraine, viral infection, dehydration. Would get labs, consult neuro and ophtho and reassess. Pt deferring meds for pain at this time.

## 2023-04-04 NOTE — ED PROVIDER NOTE - PHYSICAL EXAMINATION
General: well -appearing, no acute distress  Head: Atraumatic, normocephalic  Eyes: EOM grossly in tact, no scleral icterus, no discharge  Neurology: A&Ox 3, ataxic gait, strength 5/5 B/L, normal finger to nose, no pronator drift.   Respiratory: CTAB, no wheezing, normal respiratory effort  CV: RRR, good s1/s2, no S3, Extremities warm and well perfused  Abdominal: Soft, non-distended  Extremities: No edema, no deformities  Skin: warm and dry. No rashes

## 2023-04-04 NOTE — CONSULT NOTE ADULT - SUBJECTIVE AND OBJECTIVE BOX
Neurology - Consult Note    -  Spectra: [id number] ([facility]), [id number] ([facility])  -    HPI: Patient REANNA HOUSTON is a R-H 27y (1995) woman with a PMHx significant for recently diagnosed multiple sclerosis presents to the ED with visual blurriness and right sided weakness and gait instability. She follows with Dr. Nirali Talbert who recently diagnosed her with multiple sclerosis in 3/23/2023. [facility], " She has been followed Dr. Santosh Rodriguez, neurologist. About 1 month ago (feb), flown back from [place], and woke up with tingling in feet, constant. Two weeks prior, she had an URI. She was seen at urgent care and seen at [facility], ruled out DVT, referred to neurology. Seen by sisters rheumatologist, had blood work up, took prednisone taper, symptoms have now resolved 100%. Saw Dr. Rodriguez 3/2/2023. NO prior hx of transient neurological symptoms.  Recent MRI imaging from [facility]- MRI brain w/w/o 3/8/2023- scattered ovoid supratentorial lesions, periventricular and juxtacortical, non enhancing. No definite CC lesion. MRI C spine w/w/o 3/17/2023- no cord lesions. MRI T spine w/w/o 3/17/2023- central posterior cord lesion at T9, enhancing. Patient was planned for DMT after her honeymoon on 4/7 but patient presented to the hospital on 4/1 complaining of ocular blurriness and bifrontal headaches. She described it as her eyeballs being squeezed together. She was diagnosed with migraines w/ aura and less likely MS flare given nonfocal symptoms. She was provided a medrol pack and sent home. On 4/2, the patient was noticing difficulties with writing in her right hand and with right leg weakness and gait instability. No Lhermitte sign noted. Given persistence of symptoms, the patient's family called Dr. Talbert who recommended the patient come to the ED. Neurology consulted for blurry vision and right sided weakness.              Review of Systems:    EYES AND ENT: Blurriness in bilateral eyes   NECK: No pain or stiffness  RESPIRATORY: No shortness of breath  CARDIOVASCULAR: No chest pain  NEUROLOGICAL: +As stated in HPI above  All other review of systems is negative unless indicated above.    Allergies:  No Known Allergies      PMHx/PSHx/Family Hx: As above, otherwise see below   Multiple sclerosis        Social Hx:  No current use of tobacco, alcohol, or illicit drugs  Lives with ***    Medications:  MEDICATIONS  (STANDING):  sodium chloride 0.9% Bolus 1000 milliLiter(s) IV Bolus once    MEDICATIONS  (PRN):      Vitals:  T(C): 36.8 (04-04-23 @ 18:04), Max: 37 (04-04-23 @ 16:12)  HR: 99 (04-04-23 @ 18:04) (89 - 99)  BP: 144/94 (04-04-23 @ 18:04) (120/83 - 144/94)  RR: 18 (04-04-23 @ 18:04) (15 - 18)  SpO2: 99% (04-04-23 @ 18:04) (95% - 100%)    Physical Examination:   General - NAD, pleasant, cooperative   Cardiovascular - Peripheral pulses palpable, no edema  Neurologic Exam:  Mental status - Awake, Alert, Oriented to person, place, and time. Speech fluent, repetition and naming intact. Follows simple and complex commands. Fund of knowledge intact    Cranial nerves:  CN II: Visual fields are full to confrontation. Pupils are 6 mm and briskly reactive to light.   CN III, IV, VI: EOMI, no nystagmus, no ptosis  CN V: Facial sensation is intact to pinprick in all 3 divisions bilaterally.  CN VII: Face is symmetric with normal eye closure and smile.  CN VII: Hearing is normal to rubbing fingers  CN IX, X: Palate elevates symmetrically. Phonation is normal.  CN XI: Head turning and shoulder shrug are intact  CN XII: Tongue is midline with normal movements and no atrophy.    Motor - Normal bulk and tone throughout. No pronator drift of out-stretched arms.  Strength testing            Deltoid      Biceps      Triceps     Wrist Extension    Wrist Flexion     Interossei         R            5                 5               5                     5                              5                        -                 5  L             5                 5               5                     5                              5                        -                 5              Hip Flexion    Hip Extension    Knee Flexion    Knee Extension    Dorsiflexion    Plantar Flexion  R              5                           5                       5                           5                            5                          5  L              5                           5                        5                           5                            5                          5    Sensation - Light touch intact in fingers and toes     DTR's -             Biceps      Triceps     Brachioradialis      Patellar    Ankle    Toes/plantar response  R             3+             3+                 3+                       3+            4+                 Down  L              3+             3+                 3+                        3+           2+                 Down    Coordination - Rapid alternating movements and fine finger movements are intact. There is no dysmetria on finger-to-nose There are no abnormal or extraneous movements.    Gait and station - Posture is normal. Gait is steady with slightly wider steps wider base, arm swing, and turning. Heel and toe walking are difficult. Able to do tandem gait      Labs:                        14.8   11.29 )-----------( 483      ( 04 Apr 2023 17:56 )             43.7           CAPILLARY BLOOD GLUCOSE              CSF:                  Radiology:

## 2023-04-04 NOTE — CONSULT NOTE PEDS - SUBJECTIVE AND OBJECTIVE BOX
[facility] DEPARTMENT OF OPHTHALMOLOGY - INITIAL ADULT CONSULT  -----------------------------------------------------------------------------  Yanna Basilio MD, PGY-2  Contact: TEAMS  -----------------------------------------------------------------------------    HPI:  Patient REANNA HOUSTON is a R-H 27y (1995) woman with a PMHx significant for recently diagnosed multiple sclerosis presents to the ED with visual blurriness and right sided weakness and gait instability. She follows with Dr. Nirali Talbert who recently diagnosed her with multiple sclerosis in 3/23/2023. [facility], " She has been followed Dr. Santosh Rodriguez, neurologist. About 1 month ago (feb), flown back from [place], and woke up with tingling in feet, constant. Two weeks prior, she had an URI. She was seen at urgent care and seen at [facility], ruled out DVT, referred to neurology. Seen by sisters rheumatologist, had blood work up, took prednisone taper, symptoms have now resolved 100%. Saw Dr. Rodriguez 3/2/2023. NO prior hx of transient neurological symptoms.  Recent MRI imaging from [facility]- MRI brain w/w/o 3/8/2023- scattered ovoid supratentorial lesions, periventricular and juxtacortical, non enhancing. No definite CC lesion. MRI C spine w/w/o 3/17/2023- no cord lesions. MRI T spine w/w/o 3/17/2023- central posterior cord lesion at T9, enhancing. Patient was planned for DMT after her honeymoon on 4/7 but patient presented to the hospital on 4/1 complaining of ocular blurriness and bifrontal headaches. She described it as her eyeballs being squeezed together. She was diagnosed with migraines w/ aura and less likely MS flare given nonfocal symptoms. She was provided a medrol pack and sent home. On 4/2, the patient was noticing difficulties with writing in her right hand and with right leg weakness and gait instability. No Lhermitte sign noted. Given persistence of symptoms, the patient's family called Dr. Talbert who recommended the patient come to the ED. Neurology consulted for blurry vision and right sided weakness.      Interval History: The patient reports "black streaks" in the right eye that lasted ten minutes on 3/29. Denies flashes or floaters, although states she was told by an outside ophthalmologist that she has floaters. States vision felt normal on 3/30 and on 3/31 she noticed a "squeezing feeling" in both eyes. States her eyes having been feeling more dry lately. she wears daily contacts, and recently started using rewetting drops.    PMH: MS diagnosed a few weeks ago, not on any treatment.   POcHx: denies surg/laser  FH: denies glc/amd  Social History: denies etoh/tobacco  Ophthalmic Medications: none  Allergies: NKDA    Review of Systems:  Constitutional: No fever, chills  Eyes: No blurry vision, flashes, floaters, FBS, erythema, discharge, double vision, OU. + heaviness/squeezing in both eyes.   Neuro: No tremors  Cardiovascular: No chest pain, palpitations  Respiratory: No SOB, no cough  GI: No nausea, vomiting, abdominal pain  : No dysuria  Skin: no rash  Psych: no depression  Endocrine: no polyuria, polydipsia  Heme/lymph: no swelling    VITALS: T(C): 36.8 (04-04-23 @ 19:37)  T(F): 98.2 (04-04-23 @ 19:37), Max: 98.6 (04-04-23 @ 16:12)  HR: 85 (04-04-23 @ 19:37) (85 - 99)  BP: 124/80 (04-04-23 @ 19:37) (120/83 - 144/94)  RR:  (15 - 18)  SpO2:  (95% - 100%)  Wt(kg): --  General: AAO x 3, appropriate mood and affect    Ophthalmology Exam:  Visual acuity (sc): 20/20 OD 20/20 OS.  Pupils: PERRL OU, no APD  Ttono: 18, 17 OU  Extraocular movements (EOMs): Full OU, no pain, no diplopia  Confrontational Visual Field (CVF): Full OD Full OS  Color Plates: 12/12 OD 12/12 OS    Pen Light Exam (PLE)  External: Flat OU  Lids/Lashes/Lacrimal Ducts: Flat OU    Sclera/Conjunctiva: W+Q OU  Cornea: dtbut OU  Anterior Chamber: D+F OU    Iris: Flat OU  Lens: Cl OU    Fundus Exam: dilated with 1% tropicamide and 2.5% phenylephrine  Approval obtained from primary team for dilation  Patient aware that pupils can remained dilated for at least 4-6 hours  Exam performed with 20D lens    Vitreous: wnl OU  Disc, cup/disc: sharp and pink, 0.4 OU  Macula: wnl OU  Vessels: wnl OU  Periphery: wnl OU    Labs/Imaging:  None

## 2023-04-04 NOTE — CONSULT NOTE ADULT - ASSESSMENT
ASSESSMENT   R-H 27y (1995) woman with a PMHx significant for recently diagnosed multiple sclerosis presents to the ED with visual blurriness and right sided weakness and gait instability. Noted for previous admission for visual disturbances and discharged with diagnosis of ocular migraine. Now with right sided subjective weakness and gait instability.     IMPRESSION     Ocular blurriness and headaches with right sided subjective weakness and gait instability. Considering continuation of migraine with aura with pseudo-MS caused by various non-neurological triggers      RECOMMENDATION   [] CDU   [] Symptomatic management for headache  [] Please order for MRI w/wo of orbits, brain, and cervical spine. In the notes, please request for multiple sclerosis protocol  [] Consider opthalmology consult     Patient to be seen by team and attending. Note finalized upon attending attestation.

## 2023-04-04 NOTE — CONSULT NOTE ADULT - ATTENDING COMMENTS
Date of service: 4/5/2023    26 yo female recently seen by me in clinic and diagnosed with relapsing multiple sclerosis (developed b/l foot numbness/paresthesias in 2/2023, resolved, MRI imaging consistent with MS with active lesion at T9) and currently underlying work up to initiate DMT, migraine headache, p/w 5 day hx of b/l blurry vision and 2 day hx of gait instability with right sided weakness.     PT admitted to CDU for MRI scans to rule out MS.   ESR and CRP normal.   UA neg     MRI orbits, brain, C and T spine w/w/o completed, I personally reviewed scans, there is no evidence of optic neuritis, there are several new and enhancing lesions on brain MRI (at least 5 enhancing lesions- L frontal CLARA, R frontal PV, L subcortical, L parietal and occipital). There is also an enhancing lesion in right cervical hemicord at C6. Chronic lesion at T9 (stable).     On exam, there is no RAPD. No facial asymmetry, Motor strength UE 5/5 and LE 5/5 except R dorsiflexion 3/5. Increased tone in RLE. Diffusely brisk reflexes with sustained clonus at R knee and ankle, babinski present on the R. On gait exam, R leg is spastic, drags R leg with steppage gait. Sensations to LE intact UE and LE.     Imp/Plan:-                  R hemibody motor disturbance and gait instability- Multiple sclerosis exacerbation- multiple new/enh lesions on brain MRI, active right hemicord C6 lesion. Pt mostly likely symptomatic from cervical myelitis                 b/l blurry vision- likely dry eyes, not consistent with optic neuritis    - Recommend admission to general neurology for IV solumedrol 1000 mg QD x 5 days.   - PT and OT consult  - Appreciate Ophthalmology evaluation, no evidence of optic neuritis. Dx'd with b/l dry eye syndrome and started on eye drops.

## 2023-04-04 NOTE — ED CDU PROVIDER INITIAL DAY NOTE - DETAILS
28 yo F PMHx significant for recently diagnosed multiple sclerosis presents to the ED with visual blurriness and right sided weakness and gait instability.  Plan: frequent reeval, vitals q 4hrs, MRI w/wo of orbits, brain, and cervical spine. For multiple sclerosis protocol.

## 2023-04-04 NOTE — ED PROVIDER NOTE - OBJECTIVE STATEMENT
28 yo F with PMH of MS presenting with c/o worsening weakness and persistent eye pain and HA. PT follows with Dr. Talbert and was told to come to the ED. She is not currently on any medications. Pt denies nausea, vomiting, diarrhea, CP, dyspnea, cough, fever, chills, abd pain. Pt has a concern of a new foot drag.

## 2023-04-04 NOTE — CONSULT NOTE PEDS - ASSESSMENT
Assessment and Recommendations:  27y female with a past medical history/ocular history of MS consulted for rule out optic neuritis, found to have dry eye syndrome.     #Dry eye syndrome of both eyes  - artificial tears, one drop to both eyes, 4 times a day  - artificial tear ointment, a small amount into both eyes, nightly     #Rule out optic neuritis  - VA 20/20 OU, PERRL no RAPD, color plates 12/12 OU  - DFE with sharp pink discs OU  - No pain with EOM  - Low suspicion for optic neuritis at this time  - Appreciate neurology management      Outpatient Follow-up: Patient should follow-up with his/her ophthalmologist or with [facility] Department of Ophthalmology within 1 week of after discharge at:    [address]  [place] [address]  [phone]    Yanna Basilio MD, PGY-2  Contact: [employer] Teams

## 2023-04-05 DIAGNOSIS — R51.9 HEADACHE, UNSPECIFIED: ICD-10-CM

## 2023-04-05 LAB
CRP SERPL-MCNC: <3 MG/L — SIGNIFICANT CHANGE UP (ref 0–4)
ERYTHROCYTE [SEDIMENTATION RATE] IN BLOOD: 5 MM/HR — SIGNIFICANT CHANGE UP (ref 0–15)

## 2023-04-05 RX ORDER — ENOXAPARIN SODIUM 100 MG/ML
40 INJECTION SUBCUTANEOUS EVERY 24 HOURS
Refills: 0 | Status: DISCONTINUED | OUTPATIENT
Start: 2023-04-05 | End: 2023-04-08

## 2023-04-05 RX ORDER — ONDANSETRON 8 MG/1
4 TABLET, FILM COATED ORAL ONCE
Refills: 0 | Status: COMPLETED | OUTPATIENT
Start: 2023-04-05 | End: 2023-04-05

## 2023-04-05 RX ADMIN — ONDANSETRON 4 MILLIGRAM(S): 8 TABLET, FILM COATED ORAL at 07:38

## 2023-04-05 RX ADMIN — Medication 1 DROP(S): at 14:07

## 2023-04-05 RX ADMIN — Medication 1 DROP(S): at 23:49

## 2023-04-05 RX ADMIN — Medication 50 MILLIGRAM(S): at 11:24

## 2023-04-05 RX ADMIN — Medication 1 MILLIGRAM(S): at 07:38

## 2023-04-05 RX ADMIN — ENOXAPARIN SODIUM 40 MILLIGRAM(S): 100 INJECTION SUBCUTANEOUS at 18:31

## 2023-04-05 RX ADMIN — Medication 1 DROP(S): at 06:17

## 2023-04-05 RX ADMIN — Medication 1 DROP(S): at 18:31

## 2023-04-05 NOTE — ED CDU PROVIDER SUBSEQUENT DAY NOTE - PHYSICAL EXAMINATION
General: well -appearing, no acute distress  Head: Atraumatic, normocephalic  Eyes: EOM grossly in tact, no scleral icterus, no discharge  Neurology: A&Ox 3, steady gait, strength 5/5 B/L, normal finger to nose, no pronator drift.   Respiratory: CTAB, no wheezing, normal respiratory effort  CV: RRR, good s1/s2, no S3, Extremities warm and well perfused  Abdominal: Soft, non-distended  Extremities: No edema, no deformities  Skin: warm and dry. No rashes

## 2023-04-05 NOTE — H&P ADULT - ATTENDING COMMENTS
Patient with recent diagnosis of MS, not yet on medications, admitted for ocular blurriness and headaches with right sided subjective weakness and gait instability. Exam today with improvement in right weakness, persistent gait instability. Started on IV solumedrol 4/5/23.   Will treat as MS flare with 5 day course of steroids, follow-up MRI. Further plans as outpatient to be discussed with Dr Talbert.

## 2023-04-05 NOTE — PHYSICAL THERAPY INITIAL EVALUATION ADULT - PERTINENT HX OF CURRENT PROBLEM, REHAB EVAL
26y/o F with a PMHx significant for recently diagnosed multiple sclerosis presents to the ED with visual blurriness and R-sided weakness and gait instability. She follows with Dr. Nirali Talbert who recently diagnosed her with multiple sclerosis in 3/23/2023. [facility] " She has been followed Dr. Santosh Rodriguez, neurologist. About 1 month ago (feb), flown back from [place], and woke up with tingling in feet, constant. Two weeks prior, she had an URI. She was seen at urgent care and seen at [facility], ruled out DVT, referred to neurology. Seen by sisters rheumatologist, had blood work up, took prednisone taper, symptoms have now resolved 100%. Saw Dr. Rodriguez 3/2/2023. NO prior hx of transient neurological symptoms.  Recent MRI imaging from [facility]- MRI brain w/w/o 3/8/2023- scattered ovoid supratentorial lesions, periventricular and juxtacortical, non enhancing. No definite CC lesion. MRI C spine w/w/o 3/17/2023- no cord lesions. MRI T spine w/w/o 3/17/2023- central posterior cord lesion at T9, enhancing. Pt was planned for DMT after her honeymoon on 4/7 but pt presented to the hospital on 4/1 c/o ocular blurriness and bifrontal headaches. She described it as her eyeballs being squeezed together. She was diagnosed with migraines w/ aura and less likely MS flare given nonfocal symptoms. She was provided a medrol pack and sent home. On 4/2, the pt was noticing difficulties with writing in her R hand and with R leg weakness and gait instability. No Lhermitte sign noted. Given persistence of symptoms, the pt's family called Dr. Talbert who recommended the pt come to the ED. Neurology consulted for blurry vision and R-sided weakness. MRI ORBITS:No abnormal signal or enhancement within the optic. Intraorbital contents unremarkable MRI CERVICAL SPINE:Abnormal intrinsic cord signal and enhancement within the right lateral aspect of the cord at the T6 level, with minimal associated cord swelling. Findings are suspicious for the presence of demyelinating disease. Infectious and inflammatory processes are not entirely excluded.MRI THORACIC SPINE: Nonenhancing focus of T2 hyperintense signal within the mid and posterior cord at the T9 level.Findings are suspicious for the presence of demyelinating disease. Infectious and inflammatory processes are not entirely excluded.

## 2023-04-05 NOTE — ED CDU PROVIDER SUBSEQUENT DAY NOTE - PROGRESS NOTE DETAILS
CDU NOTE REMIGIO Gloria: MRI shows lesions that are consistent with active MS. reviewed by Neuro- who came and spoke with patient, pt admitted to hospital for IV steroid treatment.   Dr. Melgoza ok with plan/admission CDU NOTE REMIGIO Gloria: pt in MRI

## 2023-04-05 NOTE — PHYSICAL THERAPY INITIAL EVALUATION ADULT - REFERRING PHYSICIAN, REHAB EVAL
Gale O-T Plasty Text: The defect edges were debeveled with a #15 scalpel blade.  Given the location of the defect, shape of the defect and the proximity to free margins an O-T plasty was deemed most appropriate.  Using a sterile surgical marker, an appropriate O-T plasty was drawn incorporating the defect and placing the expected incisions within the relaxed skin tension lines where possible.    The area thus outlined was incised deep to adipose tissue with a #15 scalpel blade.  The skin margins were undermined to an appropriate distance in all directions utilizing iris scissors.

## 2023-04-05 NOTE — H&P ADULT - NSHPPHYSICALEXAM_GEN_ALL_CORE
Physical Examination:   General - NAD, pleasant, cooperative   Cardiovascular - Peripheral pulses palpable, no edema  Neurologic Exam:  Mental status - Awake, Alert, Oriented to person, place, and time. Speech fluent, repetition and naming intact. Follows simple and complex commands. Fund of knowledge intact    Cranial nerves:  CN II: Visual fields are full to confrontation. Pupils are 6 mm and briskly reactive to light.   CN III, IV, VI: EOMI, no nystagmus, no ptosis  CN V: Facial sensation is intact to pinprick in all 3 divisions bilaterally.  CN VII: Face is symmetric with normal eye closure and smile.  CN VII: Hearing is normal to rubbing fingers  CN IX, X: Palate elevates symmetrically. Phonation is normal.  CN XI: Head turning and shoulder shrug are intact  CN XII: Tongue is midline with normal movements and no atrophy.    Motor - Normal bulk and tone throughout. No pronator drift of out-stretched arms.  Strength testing            Deltoid      Biceps      Triceps     Wrist Extension    Wrist Flexion     Interossei         R            5                 5               5                     5                              5                        -                 5  L             5                 5               5                     5                              5                        -                 5              Hip Flexion    Hip Extension    Knee Flexion    Knee Extension    Dorsiflexion    Plantar Flexion  R              5                           5                       5                           5                            5                          5  L              5                           5                        5                           5                            5                          5    Sensation - Light touch intact in fingers and toes     DTR's -             Biceps      Triceps     Brachioradialis      Patellar    Ankle    Toes/plantar response  R             3+             3+                 3+                       3+            4+                 Down  L              3+             3+                 3+                        3+           2+                 Down    Coordination - Rapid alternating movements and fine finger movements are intact. There is no dysmetria on finger-to-nose There are no abnormal or extraneous movements.    Gait and station - Posture is normal. Gait is steady with slightly wider steps wider base, arm swing, and turning. Heel and toe walking are difficult. Able to do tandem gait

## 2023-04-05 NOTE — H&P ADULT - ASSESSMENT
27y (1995) woman with a PMHx significant for recently diagnosed multiple sclerosis presents to the ED with visual blurriness and right sided weakness and gait instability. Noted for previous admission for visual disturbances and discharged with diagnosis of ocular migraine. Now with right sided subjective weakness and gait instability. MRI with new enhancing lesions in the cortical, subcortical, and R cervical cord at the level of C6, awaiting final read. Admit to general neurology for further management    IMPRESSION : Ocular blurriness and headaches with right sided subjective weakness and gait instability due to MS Flare      Plan:  [] General neurology admission  [] Appreciate optho recs,   [] Solumedrol 1g for 5 days (4/5 - )  [] Symptomatic management for headache  [] MR thoracic spine w/wo  [] Neurocheck and vital per unit protocol  [] C/w outpatient fu with Dr. Talbert at [address] [place] [address]    Case seen and discussed with general neurology attending Dr. Talbert  Case to be seen with general floor attending in AM    27y (1995) woman with a PMHx significant for recently diagnosed multiple sclerosis presents to the ED with visual blurriness and right sided weakness and gait instability. Noted for previous admission for visual disturbances and discharged with diagnosis of ocular migraine. Now with right sided subjective weakness and gait instability. MRI with new enhancing lesions in the cortical, subcortical, and R cervical cord at the level of C6, awaiting final read. Admit to general neurology for further management    IMPRESSION : Ocular blurriness and headaches with right sided subjective weakness and gait instability due to MS Flare      Plan:  [] General neurology admission  [] Appreciate optho recs, no obvious ophthalmologic findings  [] F/u MR final read  [] Solumedrol 1g for 5 days (4/5 - )  [] Symptomatic management for headache  [] Neurocheck and vital per unit protocol  [] C/w outpatient fu with Dr. Talbert at [address] [place] [address]    Case seen and discussed with general neurology attending Dr. Talbert  Case to be seen with general floor attending in AM

## 2023-04-05 NOTE — PHYSICAL THERAPY INITIAL EVALUATION ADULT - ADDITIONAL COMMENTS
Pt lives in a condo with  (can assist). Has 14 steps to enter (+BHR) and no steps inside. Reports being independent with functional mobility/ADLs without AD. +drive, +work.

## 2023-04-05 NOTE — H&P ADULT - HISTORY OF PRESENT ILLNESS
R-H 27y (1995) woman with a PMHx significant for recently diagnosed multiple sclerosis presents to the ED with visual blurriness and right sided weakness and gait instability. She follows with Dr. Nirali Talbert who recently diagnosed her with multiple sclerosis in 3/23/2023. [facility] She has been followed Dr. Santosh Rodriguez, neurologist. About 1 month ago (feb), flown back from [place], and woke up with tingling in feet, constant. Two weeks prior, she had an URI. She was seen at urgent care and seen at [facility], ruled out DVT, referred to neurology. Seen by sisters rheumatologist, had blood work up, took prednisone taper, symptoms have now resolved 100%. Saw Dr. Rodriguez 3/2/2023. NO prior hx of transient neurological symptoms.  Recent MRI imaging from [facility]- MRI brain w/w/o 3/8/2023- scattered ovoid supratentorial lesions, periventricular and juxtacortical, non enhancing. No definite CC lesion. MRI C spine w/w/o 3/17/2023- no cord lesions. MRI T spine w/w/o 3/17/2023- central posterior cord lesion at T9, enhancing. Patient was planned for DMT after her honeymoon on 4/7 but patient presented to the hospital on 4/1 complaining of ocular blurriness and bifrontal headaches. She described it as her eyeballs being squeezed together. She was diagnosed with migraines w/ aura and less likely MS flare given nonfocal symptoms. She was provided a medrol pack and sent home. On 4/2, the patient was noticing difficulties with writing in her right hand and with right leg weakness and gait instability. No Lhermitte sign noted. Given persistence of symptoms, the patient's family called Dr. Talbert who recommended the patient come to the ED. Neurology consulted for blurry vision and right sided weakness.

## 2023-04-05 NOTE — PHYSICAL THERAPY INITIAL EVALUATION ADULT - GENERAL OBSERVATIONS, REHAB EVAL
Pt rec'd sidelying in bed, in NAD, +IVL, family at bedside. Agreeeable to PT. RN cleared pt to be seen.

## 2023-04-05 NOTE — ED CDU PROVIDER SUBSEQUENT DAY NOTE - CROS ED RESP ALL NEG
Called [facility] for mail address for controlled substance prescriptions.    [address]  [address] [id number]  [place] [address]    Percocet 5/325mg prescription for #120 tablets, dated 2/27/17 has been mailed to the above address.   negative...

## 2023-04-05 NOTE — PHYSICAL THERAPY INITIAL EVALUATION ADULT - STANDING BALANCE: DYNAMIC, REHAB EVAL
good minus Metronidazole Pregnancy And Lactation Text: This medication is Pregnancy Category B and considered safe during pregnancy.  It is also excreted in breast milk.

## 2023-04-05 NOTE — PHYSICAL THERAPY INITIAL EVALUATION ADULT - PLANNED THERAPY INTERVENTIONS, PT EVAL
Goal: Pt will be able to negotiate one flight of stairs independently with single handrail and step over step pattern in 3 weeks./balance training/gait training

## 2023-04-05 NOTE — PHYSICAL THERAPY INITIAL EVALUATION ADULT - NS ASR RISK AREAS PT EVAL
[No Respiratory Distress] : no respiratory distress [No Acute Distress] : no acute distress [Non Tender] : non-tender [Normal Rate] : heart rate was normal  [Normal Bowel Sounds] : normal bowel sounds [Normal Sclera/Conjunctiva] : normal sclera/conjunctiva [Normal Outer Ear/Nose] : the ears and nose were normal in appearance [Normal Affect] : the affect was normal [de-identified] : alert [de-identified] : multiple contractures [de-identified] : 16 mmx 25 mm stage 3 wound [de-identified] : contorted posture, multiple contractures fall

## 2023-04-05 NOTE — ED ADULT NURSE REASSESSMENT NOTE - NS ED NURSE REASSESS COMMENT FT1
07.00 Am Received the Pt from  AZAR Bustamante . Pt is Observed for weakness unsteady gait . Received the Pt A&OX 4 obeys commands Dayanara N/V/D fever chills cp SOB   Comfort care & safety measures continued  IV site looks clean & dry no signs of infiltration noted pt denies  pain IV site .  Pt is advised to call for help  call bell with in the reach pt verbalized the understanding .  pending CDU  MD couch . GCS 15/15 A&OX 4 PERRLA  size 3 Strong  Left upper & lower extremities pt feels unsteady in Rt side   Pt is weaker in rt side   No facial droop  No Hand Leg drop denies numbness tingling Continue to monitor
[id number] Break coverage for CDU RN Attempted report at 1142 and waiting for call back Kong
Pt received from RN . Pt oriented to CDU & plan of care was discussed. Pt A&O x 4. Pt in CDU for MRI and neuro checks. On neuro exam, A&O x 4, PERRLA, EOMI, weakness noted on right upper and lower extremity, sensation intact, clear speech. V/S stable, pt afebrile,  IV in place, patent and free of signs of infiltration. Pt resting in bed. Safety & comfort measures maintained. Call bell in reach. Will continue to monitor.
Received report from AZAR [place]. Pt is A&Ox3, breathing spontaneously, unlabored and speaking in full sentences on RA, neuro check done, pending CDU consult, mother at bedside. Pt safety and comfort measures provided.
Patient AOx4 and speaking coherently with parents at bedside. Notes pressure to bilateral eyes, 5/10 in severity. Neurology at bedside to discuss plan of care. Patient to obtain MRI for further evaluation.

## 2023-04-05 NOTE — H&P ADULT - NSHPLABSRESULTS_GEN_ALL_CORE
.  LABS:                         14.8   11.29 )-----------( 483      ( 04 Apr 2023 17:56 )             43.7     04-04    139  |  100  |  10  ----------------------------<  94  3.5   |  25  |  0.81    Ca    9.8      04 Apr 2023 17:56    TPro  8.1  /  Alb  4.9  /  TBili  0.3  /  DBili  x   /  AST  15  /  ALT  14  /  AlkPhos  47  04-04              RADIOLOGY, EKG & ADDITIONAL TESTS: Reviewed.

## 2023-04-05 NOTE — ED CDU PROVIDER SUBSEQUENT DAY NOTE - HISTORY
CDU PROGRESS NOTE PA SHILO: Pt resting comfortably, NAD, VSS. Pt is aox3, speaking coherently, no focal neuro deficits. Gait is steady with slightly wider steps wider base, arm swing, and turning. Heel and toe walking are difficult. Able to do tandem gait. Optho recs appreciated, Dry eye syndrome, artifical tears ordered. Pt pending MRI in am, will continue to monitor.

## 2023-04-06 LAB
ALBUMIN SERPL ELPH-MCNC: 4.6 G/DL — SIGNIFICANT CHANGE UP (ref 3.3–5)
ALP SERPL-CCNC: 49 U/L — SIGNIFICANT CHANGE UP (ref 40–120)
ALT FLD-CCNC: 44 U/L — SIGNIFICANT CHANGE UP (ref 10–45)
ANION GAP SERPL CALC-SCNC: 17 MMOL/L — SIGNIFICANT CHANGE UP (ref 5–17)
AST SERPL-CCNC: 28 U/L — SIGNIFICANT CHANGE UP (ref 10–40)
BILIRUB SERPL-MCNC: 0.4 MG/DL — SIGNIFICANT CHANGE UP (ref 0.2–1.2)
BUN SERPL-MCNC: 16 MG/DL — SIGNIFICANT CHANGE UP (ref 7–23)
CALCIUM SERPL-MCNC: 10.3 MG/DL — SIGNIFICANT CHANGE UP (ref 8.4–10.5)
CHLORIDE SERPL-SCNC: 99 MMOL/L — SIGNIFICANT CHANGE UP (ref 96–108)
CO2 SERPL-SCNC: 21 MMOL/L — LOW (ref 22–31)
CREAT SERPL-MCNC: 0.82 MG/DL — SIGNIFICANT CHANGE UP (ref 0.5–1.3)
EGFR: 100 ML/MIN/1.73M2 — SIGNIFICANT CHANGE UP
GLUCOSE SERPL-MCNC: 125 MG/DL — HIGH (ref 70–99)
HCT VFR BLD CALC: 45.4 % — HIGH (ref 34.5–45)
HGB BLD-MCNC: 15.1 G/DL — SIGNIFICANT CHANGE UP (ref 11.5–15.5)
MAGNESIUM SERPL-MCNC: 2.3 MG/DL — SIGNIFICANT CHANGE UP (ref 1.6–2.6)
MCHC RBC-ENTMCNC: 29 PG — SIGNIFICANT CHANGE UP (ref 27–34)
MCHC RBC-ENTMCNC: 33.3 GM/DL — SIGNIFICANT CHANGE UP (ref 32–36)
MCV RBC AUTO: 87.1 FL — SIGNIFICANT CHANGE UP (ref 80–100)
NRBC # BLD: 0 /100 WBCS — SIGNIFICANT CHANGE UP (ref 0–0)
PHOSPHATE SERPL-MCNC: 5.4 MG/DL — HIGH (ref 2.5–4.5)
PLATELET # BLD AUTO: 461 K/UL — HIGH (ref 150–400)
POTASSIUM SERPL-MCNC: 4.1 MMOL/L — SIGNIFICANT CHANGE UP (ref 3.5–5.3)
POTASSIUM SERPL-SCNC: 4.1 MMOL/L — SIGNIFICANT CHANGE UP (ref 3.5–5.3)
PROT SERPL-MCNC: 8.1 G/DL — SIGNIFICANT CHANGE UP (ref 6–8.3)
RBC # BLD: 5.21 M/UL — HIGH (ref 3.8–5.2)
RBC # FLD: 11.9 % — SIGNIFICANT CHANGE UP (ref 10.3–14.5)
SODIUM SERPL-SCNC: 137 MMOL/L — SIGNIFICANT CHANGE UP (ref 135–145)
WBC # BLD: 11.07 K/UL — HIGH (ref 3.8–10.5)
WBC # FLD AUTO: 11.07 K/UL — HIGH (ref 3.8–10.5)

## 2023-04-06 RX ADMIN — Medication 1 APPLICATION(S): at 22:00

## 2023-04-06 RX ADMIN — ENOXAPARIN SODIUM 40 MILLIGRAM(S): 100 INJECTION SUBCUTANEOUS at 19:00

## 2023-04-06 RX ADMIN — Medication 1 DROP(S): at 11:03

## 2023-04-06 RX ADMIN — Medication 50 MILLIGRAM(S): at 05:47

## 2023-04-06 RX ADMIN — Medication 1 DROP(S): at 05:47

## 2023-04-06 RX ADMIN — Medication 1 DROP(S): at 23:21

## 2023-04-06 RX ADMIN — Medication 1 DROP(S): at 19:01

## 2023-04-06 NOTE — OCCUPATIONAL THERAPY INITIAL EVALUATION ADULT - RANGE OF MOTION EXAMINATION, LOWER EXTREMITY
Breathing spontaneous and unlabored. Breath sounds clear and equal bilaterally with regular rhythm. bilateral LE Active ROM was WFL  (within functional limits)

## 2023-04-06 NOTE — PROGRESS NOTE ADULT - SUBJECTIVE AND OBJECTIVE BOX
SUBJECTIVE/INTERVAL HISTORY:    PAST MEDICAL & SURGICAL HISTORY:  Multiple sclerosis      No significant past surgical history        FAMILY HISTORY:  No pertinent family history in first degree relatives        MEDICATIONS (HOME):  Home Medications:  Junel 1/20 oral tablet: 1 orally once a day (05 Apr 2023 13:13)    MEDICATIONS  (STANDING):  artificial  tears Solution 1 Drop(s) Both EYES every 6 hours  enoxaparin Injectable 40 milliGRAM(s) SubCutaneous every 24 hours  methylPREDNISolone sodium succinate IVPB 1000 milliGRAM(s) IV Intermittent daily  petrolatum Ophthalmic Ointment 1 Application(s) Both EYES at bedtime    MEDICATIONS  (PRN):    ALLERGIES/INTOLERANCES:  Allergies  No Known Drug Allergies  Nuts (Anaphylaxis)  pesto (Anaphylaxis)    Intolerances    VITALS & EXAMINATION:  Vital Signs Last 24 Hrs  T(C): 37 (06 Apr 2023 05:20), Max: 37.1 (05 Apr 2023 11:31)  T(F): 98.6 (06 Apr 2023 05:20), Max: 98.8 (05 Apr 2023 11:31)  HR: 77 (06 Apr 2023 05:20) (75 - 110)  BP: 119/77 (06 Apr 2023 05:20) (104/72 - 119/80)  BP(mean): --  RR: 18 (06 Apr 2023 05:20) (18 - 18)  SpO2: 98% (06 Apr 2023 05:20) (96% - 98%)    Parameters below as of 06 Apr 2023 05:20  Patient On (Oxygen Delivery Method): room air        General:  Constitutional: Obese Female, appears stated age, in no apparent distress including pain  Head: Normocephalic & atraumatic.  ENT: Patent ear canals, intact TM, mucus membranes moist & pink, neck supple, no lymphadenopathy.   Respiratory: Patent airway. All lung fields are clear to auscultation bilaterally.  Extremities: No cyanosis, clubbing, or edema.  Skin: No rashes, bruising, or discoloration.    Cardiovascular (>2): RRR no murmurs. Carotid pulsations symmetric, no bruits. Normal capillary beds refill, 1-2 seconds or less.     Neurological (>12):  MS: Awake, alert, oriented to person, place, situation, time. Normal affect. Follows all commands.    Language: Speech is clear, fluent with good repetition & comprehension (able to name objects___)    CNs: PERRLA (R = 3mm, L = 3mm). VFF. EOMI no nystagmus, no diplopia. V1-3 intact to LT/pinprick, well developed masseter muscles b/l. No facial asymmetry b/l, full eye closure strength b/l. Hearing grossly normal (rubbing fingers) b/l. Symmetric palate elevation in midline. Gag reflex deferred. Head turning & shoulder shrug intact b/l. Tongue midline, normal movements, no atrophy.    Fundoscopic: pale w/ sharp discs margins No vascular changes.      Motor: Normal muscle bulk & tone. No noticeable tremor or seizure. No pronator drift.              Deltoid	Biceps	Triceps	Wrist	Finger ABd	   R	5	5	5	5	5		5 	  L	5	5	5	5	5		5    	H-Flex	H-Ext	H-ABd	H-ADd	K-Flex	K-Ext	D-Flex	P-Flex  R	5	5	5	5	5	5	5	5 	   L	5	5	5	5	5	5	5	5	     Sensation: Intact to LT/PP/Temp/Vibration/Position b/l throughout.     Cortical: Extinction on DSS (neglect): none    Reflexes:              Biceps(C5)       BR(C6)     Triceps(C7)               Patellar(L4)    Achilles(S1)    Plantar Resp  R	2	          2	             2		        2		    2		Down   L	2	          2	             2		        2		    2		Down     Coordination: intact rapid-alt movements. No dysmetria to FTN/HTS    Gait: Normal Romberg. No postural instability. Normal stance and tandem gait.     LABORATORY:  CBC                       15.1   11.07 )-----------( 461      ( 06 Apr 2023 06:18 )             45.4     Chem 04-06    137  |  99  |  16  ----------------------------<  125<H>  4.1   |  21<L>  |  0.82    Ca    10.3      06 Apr 2023 06:18  Phos  5.4     04-06  Mg     2.3     04-06    TPro  8.1  /  Alb  4.6  /  TBili  0.4  /  DBili  x   /  AST  28  /  ALT  44  /  AlkPhos  49  04-06    LFTs LIVER FUNCTIONS - ( 06 Apr 2023 06:18 )  Alb: 4.6 g/dL / Pro: 8.1 g/dL / ALK PHOS: 49 U/L / ALT: 44 U/L / AST: 28 U/L / GGT: x           Coagulopathy   Lipid Panel   A1c   Cardiac enzymes     U/A     STUDIES & IMAGING:  Studies (EKG, EEG, EMG, etc):     Radiology (XR, CT, MR, U/S, TTE/CLARY): SUBJECTIVE/INTERVAL HISTORY:  Patient feels better and feels like her strength is getting back     PAST MEDICAL & SURGICAL HISTORY:  Multiple sclerosis      No significant past surgical history        FAMILY HISTORY:  No pertinent family history in first degree relatives        MEDICATIONS (HOME):  Home Medications:  Junel 1/20 oral tablet: 1 orally once a day (05 Apr 2023 13:13)    MEDICATIONS  (STANDING):  artificial  tears Solution 1 Drop(s) Both EYES every 6 hours  enoxaparin Injectable 40 milliGRAM(s) SubCutaneous every 24 hours  methylPREDNISolone sodium succinate IVPB 1000 milliGRAM(s) IV Intermittent daily  petrolatum Ophthalmic Ointment 1 Application(s) Both EYES at bedtime    MEDICATIONS  (PRN):    ALLERGIES/INTOLERANCES:  Allergies  No Known Drug Allergies  Nuts (Anaphylaxis)  pesto (Anaphylaxis)    Intolerances    VITALS & EXAMINATION:  Vital Signs Last 24 Hrs  T(C): 37 (06 Apr 2023 05:20), Max: 37.1 (05 Apr 2023 11:31)  T(F): 98.6 (06 Apr 2023 05:20), Max: 98.8 (05 Apr 2023 11:31)  HR: 77 (06 Apr 2023 05:20) (75 - 110)  BP: 119/77 (06 Apr 2023 05:20) (104/72 - 119/80)  BP(mean): --  RR: 18 (06 Apr 2023 05:20) (18 - 18)  SpO2: 98% (06 Apr 2023 05:20) (96% - 98%)    Parameters below as of 06 Apr 2023 05:20  Patient On (Oxygen Delivery Method): room air        General - NAD, pleasant, cooperative   Cardiovascular - Peripheral pulses palpable, no edema  Neurologic Exam:  Mental status - Awake, Alert, Oriented to person, place, and time. Speech fluent, repetition and naming intact. Follows simple and complex commands. Fund of knowledge intact    Cranial nerves:  CN II: Visual fields are full to confrontation. Pupils are 6 mm and briskly reactive to light.   CN III, IV, VI: EOMI, no nystagmus, no ptosis  CN V: Facial sensation is intact to pinprick in all 3 divisions bilaterally.  CN VII: Face is symmetric with normal eye closure and smile.  CN VII: Hearing is normal to rubbing fingers  CN IX, X: Palate elevates symmetrically. Phonation is normal.  CN XI: Head turning and shoulder shrug are intact  CN XII: Tongue is midline with normal movements and no atrophy.    Motor - Normal bulk and tone throughout. No pronator drift of out-stretched arms.  Strength testing            Deltoid      Biceps      Triceps     Wrist Extension    Wrist Flexion     Interossei         R            5                 5               5                     5                              5                        -                 5  L             5                 5               5                     5                              5                        -                 5              Hip Flexion    Hip Extension    Knee Flexion    Knee Extension    Dorsiflexion    Plantar Flexion  R              5                           5                       5                           5                            5                          5  L              4+                           5                        5                           5                            4+                          5    Sensation - Light touch intact in UE and LE     DTR's -             Biceps      Triceps     Brachioradialis      Patellar    Ankle    Toes/plantar response  R             3+             3+                 3+                       3+            4+                 Down  L              3+             3+                 3+                        3+           4+                 Down    Coordination - Rapid alternating movements and fine finger movements are intact. There is no dysmetria on finger-to-nose There are no abnormal or extraneous movements.    Gait and station - Posture is normal. Gait is steady with slightly wider steps wider base, and a steppage gait. Difficulty with tandem gait  LABORATORY:  CBC                       15.1   11.07 )-----------( 461      ( 06 Apr 2023 06:18 )             45.4     Chem 04-06    137  |  99  |  16  ----------------------------<  125<H>  4.1   |  21<L>  |  0.82    Ca    10.3      06 Apr 2023 06:18  Phos  5.4     04-06  Mg     2.3     04-06    TPro  8.1  /  Alb  4.6  /  TBili  0.4  /  DBili  x   /  AST  28  /  ALT  44  /  AlkPhos  49  04-06    LFTs LIVER FUNCTIONS - ( 06 Apr 2023 06:18 )  Alb: 4.6 g/dL / Pro: 8.1 g/dL / ALK PHOS: 49 U/L / ALT: 44 U/L / AST: 28 U/L / GGT: x           Coagulopathy   Lipid Panel   A1c   Cardiac enzymes     U/A     STUDIES & IMAGING:  Studies (EKG, EEG, EMG, etc):     Radiology (XR, CT, MR, U/S, TTE/CLARY):  (04.05.23 @ 09:51) >  MRI BRAIN:  Multifocal T2 and FLAIR hyperintense lesions, many of which demonstrate   somewhat solid enhancement and restricted diffusion.    Some of the lesions are periventricular, ovoid in shape, and oriented in   a perpendicular fashion to the long axis of the ventricles. Others are   subcortical in nature.    Findings are suspicious for the presence of demyelinating disease.   Infectious and inflammatory processes are not entirely excluded.    MRI ORBITS:  No abnormal signal or enhancement within the optic.  Intraorbital contents unremarkable    MRI CERVICAL SPINE:  Abnormal intrinsic cord signal and enhancement within the right lateral   aspect of the cord at the T6 level, with minimal associated cord swelling.    Findings are suspicious for the presence of demyelinating disease.   Infectious and inflammatory processes are not entirely excluded.    MRI THORACIC SPINE:  Nonenhancing focus of T2 hyperintense signal within the mid and posterior   cord at the T9 level.    Findings are suspicious for the presence of demyelinating disease.  Infectious and inflammatory processes are not entirely excluded.

## 2023-04-06 NOTE — PROGRESS NOTE ADULT - ASSESSMENT
27y (1995) woman with a PMHx significant for recently diagnosed multiple sclerosis presents to the ED with visual blurriness and right sided weakness and gait instability. Noted for previous admission for visual disturbances and discharged with diagnosis of ocular migraine. Now with right sided subjective weakness and gait instability. MRI with new enhancing lesions in the cortical, subcortical, and R cervical cord at the level of C6, awaiting final read. Admit to general neurology for further management    IMPRESSION : Ocular blurriness and headaches with right sided subjective weakness and gait instability due to MS Flare      Plan:  [] General neurology admission  [] Appreciate optho recs, no obvious ophthalmologic findings  [] F/u MR final read  [] Solumedrol 1g for 5 days (4/5 - )  [] Symptomatic management for headache  [] Neurocheck and vital per unit protocol  [] C/w outpatient fu with Dr. Talbert at [address] [place] [address]    Case to be seen with general floor attending in AM   27y (1995) woman with a PMHx significant for recently diagnosed multiple sclerosis presents to the ED with visual blurriness and right sided weakness and gait instability. Noted for previous admission for visual disturbances and discharged with diagnosis of ocular migraine. Now with right sided subjective weakness and gait instability. MRI with new enhancing lesions in the cortical, subcortical, and R cervical cord at the level of C6, awaiting final read. Admit to general neurology for further management    IMPRESSION : Ocular blurriness and headaches with right sided subjective weakness and gait instability due to MS Flare      Plan:  [x] Appreciate optho recs, no obvious ophthalmologic findings  [x] MR with increasing enhancement on T6 area with minimal associated cord swelling   [] Solumedrol 1g for 5 days (4/5 - )  [] Neurocheck and vital per unit protocol  [] C/w outpatient fu with Dr. Talbert at [address] [place] [address]    Patient seen with Neurology attending with team

## 2023-04-06 NOTE — PROGRESS NOTE ADULT - ASSESSMENT
Assessment and Recommendations:  27y female recently diagnosed with multiple sclerosis presenting with blurry vision, right sided weakness and gait instability.    #Dry eye syndrome of both eyes  - pt reports focusing improved with new glasses.  - artificial tears, one drop to both eyes, 4 times a day  - artificial tear ointment, a small amount into both eyes, nightly     # Rule out optic neuritis  - VA 20/20 OU, PERRL no RAPD, color plates 12/12 OU  - DFE with sharp pink discs OU  - No pain with EOM  - Low suspicion for optic neuritis  - MRI orbits without abnormal enhancement of optic nerves  - Appreciate neurology management  - can follow-up with Dr. Lan after discharge from hospital    Ophthalmology signing off. Please reconsult prn.   Pt seen and discussed with Dr. Martin, attending.     Outpatient Follow-up: Patient should follow-up with his/her ophthalmologist or with [facility] Department of Ophthalmology within 1 week of after discharge at:    [address]  [place] [address]  [phone]    Yanna Basilio MD, PGY-2  Contact: [employer] Teams     Assessment and Recommendations:  27y female recently diagnosed with multiple sclerosis presenting with blurry vision, right sided weakness and gait instability.    #Dry eye syndrome of both eyes  - pt reports focusing improved with new glasses.  - artificial tears, one drop to both eyes, 4 times a day  - artificial tear ointment, a small amount into both eyes, nightly     # Rule out optic neuritis  - VA 20/20 OU, PERRL no RAPD, color plates 12/12 OU  - DFE with sharp pink discs OU  - No pain with EOMs  - Low suspicion for optic neuritis  - MRI orbits without abnormal enhancement of optic nerves  - Appreciate neurology management  - can follow-up with Dr. Lan after discharge from hospital    Ophthalmology signing off. Please reconsult prn.   Pt seen and discussed with Dr. Martin, attending.     Outpatient Follow-up: Patient should follow-up with his/her ophthalmologist or with [facility] Department of Ophthalmology within 1 week of after discharge at:    [address] [facility] [address] [facility]  [place] [address]  [phone]

## 2023-04-06 NOTE — OCCUPATIONAL THERAPY INITIAL EVALUATION ADULT - LIVES WITH, PROFILE
Pt lives with  in condo, +15 steps, walk in shower. Pt I in ADLs and ambulation prior to admission/spouse

## 2023-04-06 NOTE — PROGRESS NOTE ADULT - SUBJECTIVE AND OBJECTIVE BOX
[facility] DEPARTMENT OF OPHTHALMOLOGY  ------------------------------------------------------------------------------  Eun Bell MD PGY-3  ------------------------------------------------------------------------------    Interval History: No acute events overnight. Pt reports blurry vision slightly improved with new glasses.    MEDICATIONS  (STANDING):  artificial  tears Solution 1 Drop(s) Both EYES every 6 hours  enoxaparin Injectable 40 milliGRAM(s) SubCutaneous every 24 hours  methylPREDNISolone sodium succinate IVPB 1000 milliGRAM(s) IV Intermittent daily  petrolatum Ophthalmic Ointment 1 Application(s) Both EYES at bedtime    MEDICATIONS  (PRN):      VITALS: T(C): 36.7 (04-06-23 @ 16:26)  T(F): 98.1 (04-06-23 @ 16:26), Max: 98.6 (04-06-23 @ 05:20)  HR: 77 (04-06-23 @ 16:26) (75 - 98)  BP: 117/75 (04-06-23 @ 16:26) (104/72 - 119/80)  RR:  (18 - 18)  SpO2:  (96% - 98%)  Wt(kg): --  General: AAOx3    Ophthalmology Exam:  Visual acuity (sc): 20/20 OD, 20/20 OS  Pupils: PERRL OU, no APD.  Extraocular movements (EOMs): Full OU, no pain, no diplopia  Confrontational Visual Field (CVF): Full OU  Color plates: 12/12 OU    Pen Light Exam (PLE)  External: Flat OU.  Lids/Lashes/Lacrimal Ducts: Flat OU.   Sclera/Conjunctiva: White and quiet OU.  Cornea: Clear OU.  Anterior Chamber: Deep and formed OU.    Iris: Flat OU.  Lens: Clear OU.    Fundus Exam: dilated with 1% tropicamide and 2.5% phenylephrine  Approval obtained from primary team for dilation  Patient aware that pupils can remained dilated for at least 4-6 hours  Exam performed with 20D lens    Vitreous: wnl OU  Disc, cup/disc: sharp and pink, 0.4 OU  Macula: wnl OU  Vessels: wnl OU  Periphery: wnl OU    < from: MR Orbits w/wo IV Cont (04.05.23 @ 09:51) >  IMPRESSION:    MRI BRAIN:  Multifocal T2 and FLAIR hyperintense lesions, many of which demonstrate   somewhat solid enhancement and restricted diffusion.    Some of the lesions are periventricular, ovoid in shape, and oriented in   a perpendicular fashion to the long axis of the ventricles. Others are   subcortical in nature.    Findings are suspicious for the presence of demyelinating disease.   Infectious and inflammatory processes are not entirely excluded.    MRI ORBITS:  No abnormal signal or enhancement within the optic.  Intraorbital contents unremarkable    MRI CERVICAL SPINE:  Abnormal intrinsic cord signal and enhancement within the right lateral   aspect of the cord at the T6 level, with minimal associated cord swelling.    Findings are suspicious for the presence of demyelinating disease.   Infectious and inflammatory processes are not entirely excluded.    MRI THORACIC SPINE:  Nonenhancing focus of T2 hyperintense signal within the mid and posterior   cord at the T9 level.    Findings are suspicious for the presence of demyelinating disease.  Infectious and inflammatory processes are not entirely excluded.    < end of copied text >

## 2023-04-06 NOTE — OCCUPATIONAL THERAPY INITIAL EVALUATION ADULT - PERTINENT HX OF CURRENT PROBLEM, REHAB EVAL
R-H 27y (1995) woman with a PMHx significant for recently diagnosed multiple sclerosis presents to the ED with visual blurriness and right sided weakness and gait instability. She follows with Dr. Nirali Talbert who recently diagnosed her with multiple sclerosis in 3/23/2023. History acquired from [facility], " She has been followed Dr. Santosh Rodriguez, neurologist. About 1 month ago (feb), flown back from [place], and woke up with tingling in feet, constant. Two weeks prior, she had an URI. She was seen at urgent care and seen at [facility], ruled out DVT, referred to neurology. Seen by sisters rheumatologist, had blood work up, took prednisone taper, symptoms have now resolved 100%. Saw Dr. Rodriguez 3/2/2023. NO prior hx of transient neurological symptoms.  Recent MRI imaging from [facility]- MRI brain w/w/o 3/8/2023- scattered ovoid supratentorial lesions, periventricular and juxtacortical, non enhancing. No definite CC lesion. MRI C spine w/w/o 3/17/2023- no cord lesions. MRI T spine w/w/o 3/17/2023- central posterior cord lesion at T9, enhancing. Patient was planned for DMT after her honeymoon on 4/7 but patient presented to the hospital on 4/1 complaining of ocular blurriness and bifrontal headaches. She described it as her eyeballs being squeezed together. She was diagnosed with migraines w/ aura and less likely MS flare given nonfocal symptoms. She was provided a medrol pack and sent home. On 4/2, the patient was noticing difficulties with writing in her right hand and with right leg weakness and gait instability. No Lhermitte sign noted. Given persistence of symptoms, the patient's family called Dr. Talbert who recommended the patient come to the ED. Neurology consulted for blurry vision and right sided weakness.

## 2023-04-06 NOTE — PATIENT PROFILE ADULT - FALL HARM RISK - HARM RISK INTERVENTIONS
Assistance with ambulation/Assistance OOB with selected safe patient handling equipment/Communicate Risk of Fall with Harm to all staff/Discuss with provider need for PT consult/Monitor gait and stability/Reinforce activity limits and safety measures with patient and family/Tailored Fall Risk Interventions/Visual Cue: Yellow wristband and red socks/Bed in lowest position, wheels locked, appropriate side rails in place/Call bell, personal items and telephone in reach/Instruct patient to call for assistance before getting out of bed or chair/Non-slip footwear when patient is out of bed/[place] to call system/Physically safe environment - no spills, clutter or unnecessary equipment/Purposeful Proactive Rounding/Room/bathroom lighting operational, light cord in reach

## 2023-04-07 ENCOUNTER — TRANSCRIPTION ENCOUNTER (OUTPATIENT)
Age: 28
End: 2023-04-07

## 2023-04-07 LAB
ANION GAP SERPL CALC-SCNC: 14 MMOL/L — SIGNIFICANT CHANGE UP (ref 5–17)
BUN SERPL-MCNC: 18 MG/DL — SIGNIFICANT CHANGE UP (ref 7–23)
CALCIUM SERPL-MCNC: 9.8 MG/DL — SIGNIFICANT CHANGE UP (ref 8.4–10.5)
CHLORIDE SERPL-SCNC: 99 MMOL/L — SIGNIFICANT CHANGE UP (ref 96–108)
CO2 SERPL-SCNC: 24 MMOL/L — SIGNIFICANT CHANGE UP (ref 22–31)
CREAT SERPL-MCNC: 0.77 MG/DL — SIGNIFICANT CHANGE UP (ref 0.5–1.3)
EGFR: 108 ML/MIN/1.73M2 — SIGNIFICANT CHANGE UP
GLUCOSE SERPL-MCNC: 138 MG/DL — HIGH (ref 70–99)
HCT VFR BLD CALC: 43.2 % — SIGNIFICANT CHANGE UP (ref 34.5–45)
HGB BLD-MCNC: 14.3 G/DL — SIGNIFICANT CHANGE UP (ref 11.5–15.5)
MAGNESIUM SERPL-MCNC: 2.2 MG/DL — SIGNIFICANT CHANGE UP (ref 1.6–2.6)
MCHC RBC-ENTMCNC: 29.4 PG — SIGNIFICANT CHANGE UP (ref 27–34)
MCHC RBC-ENTMCNC: 33.1 GM/DL — SIGNIFICANT CHANGE UP (ref 32–36)
MCV RBC AUTO: 88.9 FL — SIGNIFICANT CHANGE UP (ref 80–100)
NRBC # BLD: 0 /100 WBCS — SIGNIFICANT CHANGE UP (ref 0–0)
PHOSPHATE SERPL-MCNC: 3.3 MG/DL — SIGNIFICANT CHANGE UP (ref 2.5–4.5)
PLATELET # BLD AUTO: 466 K/UL — HIGH (ref 150–400)
POTASSIUM SERPL-MCNC: 4.3 MMOL/L — SIGNIFICANT CHANGE UP (ref 3.5–5.3)
POTASSIUM SERPL-SCNC: 4.3 MMOL/L — SIGNIFICANT CHANGE UP (ref 3.5–5.3)
RBC # BLD: 4.86 M/UL — SIGNIFICANT CHANGE UP (ref 3.8–5.2)
RBC # FLD: 12.1 % — SIGNIFICANT CHANGE UP (ref 10.3–14.5)
SODIUM SERPL-SCNC: 137 MMOL/L — SIGNIFICANT CHANGE UP (ref 135–145)
VIT B12 SERPL-MCNC: 426 PG/ML — SIGNIFICANT CHANGE UP (ref 232–1245)
VIT D25+D1,25 OH+D1,25 PNL SERPL-MCNC: 29.8 PG/ML — SIGNIFICANT CHANGE UP (ref 19.9–79.3)
WBC # BLD: 18.15 K/UL — HIGH (ref 3.8–10.5)
WBC # FLD AUTO: 18.15 K/UL — HIGH (ref 3.8–10.5)

## 2023-04-07 RX ADMIN — Medication 1 APPLICATION(S): at 22:12

## 2023-04-07 RX ADMIN — ENOXAPARIN SODIUM 40 MILLIGRAM(S): 100 INJECTION SUBCUTANEOUS at 17:15

## 2023-04-07 RX ADMIN — Medication 50 MILLIGRAM(S): at 05:39

## 2023-04-07 RX ADMIN — Medication 1 DROP(S): at 05:39

## 2023-04-07 RX ADMIN — Medication 1 DROP(S): at 11:46

## 2023-04-07 RX ADMIN — Medication 1 DROP(S): at 23:49

## 2023-04-07 RX ADMIN — Medication 1 DROP(S): at 20:04

## 2023-04-07 NOTE — PROGRESS NOTE ADULT - ASSESSMENT
Assessment: 27-year-old right-handed female w/ PMHx multiple sclerosis (recently diagnosed 3/23/23, f/b Dr. Nirali Talbert) p/w b/l blurry vision, right hemiparesis a/w gait instability. In 2/2023, onset paresthesias in feet; seen by rheumatologist, started on prednisone taper w/ subsequent resolution. MRI brain w/wo 3/8/23 w/ scattered ovoid supratentorial lesions, periventricular and juxtacortical, non-enhancing; MRI cervical w/wo 3/17/23 w/o cord lesions; MRI thoracic w/wo 3/17/23- central posterior cord lesion at T9, enhancing. Planned for DMTs after honeymoon 4/7, but presenting to hospital 4/1 c/o bifrontal HA and b/l blurry vision; diagnosed w/ ocular migraine; discharged on Medrol Dosepak. On 4/2, onset right hemiparesis; admitted for MS management.     Inpatient imaging: MRI orbits/brain/C/T w/wo 4/5/23: no e/o optic neuritis; new and enhancing lesions L frontal CLARA, R frontal PV, L subcortical, L parietal and occipital; enhancing lesion right cervical hemicord at C6; chronic, stable lesion at T9.    Impression:     Plan:     Assessment: 27-year-old right-handed female w/ PMHx multiple sclerosis (recently diagnosed 3/23/23, f/b Dr. Nirali Talbert) p/w b/l blurry vision, right hemiparesis a/w gait instability. In 2/2023, onset paresthesias in feet; seen by rheumatologist, started on prednisone taper w/ subsequent resolution. MRI brain w/wo 3/8/23 w/ scattered ovoid supratentorial lesions, periventricular and juxtacortical, non-enhancing; MRI cervical w/wo 3/17/23 w/o cord lesions; MRI thoracic w/wo 3/17/23- central posterior cord lesion at T9, enhancing. Planned for DMTs after honeymoon 4/7, but presenting to hospital 4/1 c/o bifrontal HA and b/l blurry vision; diagnosed w/ ocular migraine; discharged on Medrol Dosepak. On 4/2, onset right hemiparesis; admitted for MS management.     Inpatient imaging: MRI orbits/brain/C/T w/wo 4/5/23: no e/o optic neuritis; new and enhancing lesions L frontal CLARA, R frontal PV, L subcortical, L parietal and occipital; enhancing lesion right cervical hemicord at C6; chronic, stable lesion at T9.    Impression: (1) Bilateral blurry vision w/o clinical or radiographic evidence of optic neuritis; most likely due to dry eye syndrome in both eyes, improving s/p new glasses and eye drops. (2) New onset right hemiparesis localizable to right cervical hemicord demyelinating lesion due to multiple sclerosis, symptomatically improving on pulse steroids.    Plan:     [/] Solumedrol 1g for 5 days (4/5-4/9) - patient requesting to leave 4/8 to make it home before Easter Sunday 4/9. Will plan for early 4/8 dose f/b late evening 4/8 dose.  [/] Symptomatic management for headache  [x] MRI w/wo of orbits, brain, cervical and thoracic spine (results above)  [x] Ophthalmology consult - dry eye syndrome, treat w/ artificial tears and eye ointment qHS  [x] Only home med is OCP Junel (ethinylestradiol / norethisterone), has her own, can take if she needs to.   Assessment: 27-year-old right-handed female w/ PMHx multiple sclerosis (recently diagnosed 3/23/23, f/b Dr. Nirali Talbert) p/w b/l blurry vision, right hemiparesis a/w gait instability. In 2/2023, onset paresthesias in feet; seen by rheumatologist, started on prednisone taper w/ subsequent resolution. MRI brain w/wo 3/8/23 w/ scattered ovoid supratentorial lesions, periventricular and juxtacortical, non-enhancing; MRI cervical w/wo 3/17/23 w/o cord lesions; MRI thoracic w/wo 3/17/23- central posterior cord lesion at T9, enhancing. Planned for DMTs after honeymoon 4/7, but presenting to hospital 4/1 c/o bifrontal HA and b/l blurry vision; diagnosed w/ ocular migraine; discharged on Medrol Dosepak. On 4/2, onset right hemiparesis; admitted for MS management.     Inpatient imaging: MRI orbits/brain/C/T w/wo 4/5/23: no e/o optic neuritis; new and enhancing lesions L frontal CLARA, R frontal PV, L subcortical, L parietal and occipital; enhancing lesion right cervical hemicord at C6; chronic, stable lesion at T9.    Impression: (1) Bilateral blurry vision w/o clinical or radiographic evidence of optic neuritis; most likely due to dry eye syndrome in both eyes, improving s/p new glasses and eye drops. (2) New onset right hemiparesis localizable to right cervical hemicord demyelinating lesion due to multiple sclerosis, symptomatically improving on pulse steroids.    Plan:     [/] Methylprednisolone 1,000 mg QD for 5 days (4/5-4/9) - patient requesting to leave 4/8 to make it home before Easter Sunday 4/9. Will plan for early 4/8 dose f/b late evening 4/8 dose.  [/] Symptomatic management for headache  [x] MRI w/wo of orbits, brain, cervical and thoracic spine (results above)  [x] Ophthalmology consult - dry eye syndrome, treat w/ artificial tears and eye ointment qHS  [x] Only home med is OCP Junel (ethinylestradiol / norethisterone), has her own, can take if she needs to.   Assessment: 27-year-old right-handed female w/ PMHx multiple sclerosis (recently diagnosed 3/23/23, f/b Dr. Nirali Talbert) p/w b/l blurry vision, right hemiparesis a/w gait instability. In 2/2023, onset paresthesias in feet; seen by rheumatologist, started on prednisone taper w/ subsequent resolution. MRI brain w/wo 3/8/23 w/ scattered ovoid supratentorial lesions, periventricular and juxtacortical, non-enhancing; MRI cervical w/wo 3/17/23 w/o cord lesions; MRI thoracic w/wo 3/17/23- central posterior cord lesion at T9, enhancing. Planned for DMTs after honeymoon 4/7, but presenting to hospital 4/1 c/o bifrontal HA and b/l blurry vision; diagnosed w/ ocular migraine; discharged on Medrol Dosepak. On 4/2, onset right hemiparesis; admitted for MS management.     Inpatient imaging: MRI orbits/brain/C/T w/wo 4/5/23: no e/o optic neuritis; new and enhancing lesions L frontal CLARA, R frontal PV, L subcortical, L parietal and occipital; enhancing lesion right cervical hemicord at C6; chronic, stable lesion at T9.    Impression: (1) Bilateral blurry vision w/o clinical or radiographic evidence of optic neuritis; most likely due to dry eye syndrome in both eyes, improving s/p new glasses and eye drops. (2) New onset right hemiparesis localizable to right cervical hemicord demyelinating lesion due to multiple sclerosis, symptomatically improving on pulse steroids.    Plan:   [/] Methylprednisolone 1,000 mg QD for 5 days (4/5-4/8)  [x] MRI w/wo of orbits, brain, cervical and thoracic spine (results above)  [x] Ophthalmology consult - dry eye syndrome, treat w/ artificial tears and eye ointment qHS  [x] Only home med is OCP Junel (ethinylestradiol / norethisterone), has her own, can take if she needs to.    Discharge planning:  - Physical and occupational therapy outpatient  - High dose oral prednisone 1,250 mg on 4/9 (to be taken at home)

## 2023-04-07 NOTE — DISCHARGE NOTE PROVIDER - CARE PROVIDER_API CALL
Nirali Talbert)  Neurology  [address] [place] [address]  Phone: [phone]  Fax: [phone]  Established Patient  Follow Up Time: Routine

## 2023-04-07 NOTE — DISCHARGE NOTE PROVIDER - NSDCCAREPROVSEEN_GEN_ALL_CORE_FT
Praful, Yanna Valladares, Tom Farrell, Milla Coleman, Mark Anthony De La O, Phillip Jurado, Pattie Whelan, Marilu Besaley, Adilson Gonzales, Bassam Alvarado, Eun Be

## 2023-04-07 NOTE — PROGRESS NOTE ADULT - SUBJECTIVE AND OBJECTIVE BOX
Neurology  Progress Note  04-07-23    Name: REANNA HOUSTON 27y    Review of Systems: Subjectively improved strength; b/l blurry vision improving; no new complaints.    Medications:  MEDICATIONS  (STANDING):  artificial  tears Solution 1 Drop(s) Both EYES every 6 hours  enoxaparin Injectable 40 milliGRAM(s) SubCutaneous every 24 hours  methylPREDNISolone sodium succinate IVPB 1000 milliGRAM(s) IV Intermittent daily  petrolatum Ophthalmic Ointment 1 Application(s) Both EYES at bedtime    MEDICATIONS  (PRN):      Vitals:  T(C): 36.8 (04-07-23 @ 06:00), Max: 37 (04-06-23 @ 11:05)  HR: 82 (04-07-23 @ 06:00) (74 - 98)  BP: 113/73 (04-07-23 @ 06:00) (113/73 - 121/78)  RR: 19 (04-07-23 @ 06:00) (18 - 19)  SpO2: 96% (04-07-23 @ 06:00) (96% - 98%)      Neurologic Examination:      - Mental Status: Alert, awake, oriented to person, month, year, location, and situation; speech is fluent with and follows commands    - Cranial Nerves:  II: Visual fields are full to confrontation; pupils are equal, round, and reactive to light   III, IV, VI: Extraocular movements are intact without nystagmus  V: Facial sensation is intact in the V1-V3 distribution bilaterally  VII: Face is symmetric with normal eye closure and smile  VIII: Hearing is intact to conversation  IX, X: Uvula is midline and soft palate rises symmetrically  XII: Tongue protrudes in the midline    - Motor/Strength Testing:                                 Right           Left  Biceps                      5                 5  Triceps                     4+                5   (RUE limited by cramp in triceps)   Wrist Ext (radial)       4+                 5  Hand                 4+                 5  Pincer grasp             4+                 5    Hip Flex                   4+                 5  Knee Ext	      5                  5  Dorsiflex                 4+                  5  Plantarflex               5                  5    - Normal muscle bulk and tone throughout.    - Reflexes:   Bicep (C5/C6):                  R 3+ --- L 3+ (more brisk on R, pectoral reflex b/l)  Brachioradialis (C5/C6) :   R 3+ --- L 3+ (more brisk on R, Stringer b/l)  Patella (L3/L4) :                 R 4+ --- L 3+ (clonus R, suprapatellar L)  Ankle (S1) :                       R 4+ --- L 2+ (12-13 beats clonus, tested while supine)    - Plant responses down bilaterally.    - Sensory: Intact throughout to light touch x 4 extremtiies  - Coordination: Finger-nose-finger intact without ataxia or dysmetria.    - Romberg: negative.  - Gait: Hemiparetic gait.        Labs:                        14.3   18.15 )-----------( 466      ( 07 Apr 2023 06:34 )             43.2     04-07    137  |  99  |  18  ----------------------------<  138<H>  4.3   |  24  |  0.77    Ca    9.8      07 Apr 2023 06:34  Phos  3.3     04-07  Mg     2.2     04-07    TPro  8.1  /  Alb  4.6  /  TBili  0.4  /  DBili  x   /  AST  28  /  ALT  44  /  AlkPhos  49  04-06    CAPILLARY BLOOD GLUCOSE        LIVER FUNCTIONS - ( 06 Apr 2023 06:18 )  Alb: 4.6 g/dL / Pro: 8.1 g/dL / ALK PHOS: 49 U/L / ALT: 44 U/L / AST: 28 U/L / GGT: x              Radiology:  MRI BRAIN:  Multifocal T2 and FLAIR hyperintense lesions, many of which demonstrate   somewhat solid enhancement and restricted diffusion.    Some of the lesions are periventricular, ovoid in shape, and oriented in   a perpendicular fashion to the long axis of the ventricles. Others are   subcortical in nature.    Findings are suspicious for the presence of demyelinating disease.   Infectious and inflammatory processes are not entirely excluded.    MRI ORBITS:  No abnormal signal or enhancement within the optic.  Intraorbital contents unremarkable    MRI CERVICAL SPINE:  Abnormal intrinsic cord signal and enhancement within the right lateral   aspect of the cord at the T6 level, with minimal associated cord swelling.    Findings are suspicious for the presence of demyelinating disease.   Infectious and inflammatory processes are not entirely excluded.    MRI THORACIC SPINE:  Nonenhancing focus of T2 hyperintense signal within the mid and posterior   cord at the T9 level.    Findings are suspicious for the presence of demyelinating disease.  Infectious and inflammatory processes are not entirely excluded.   Neurology  Progress Note  04-07-23    Name: REANNA HOUSTON 27y    Review of Systems: Subjectively improved strength; b/l blurry vision improving; no new complaints.    Medications:  MEDICATIONS  (STANDING):  artificial  tears Solution 1 Drop(s) Both EYES every 6 hours  enoxaparin Injectable 40 milliGRAM(s) SubCutaneous every 24 hours  methylPREDNISolone sodium succinate IVPB 1000 milliGRAM(s) IV Intermittent daily  petrolatum Ophthalmic Ointment 1 Application(s) Both EYES at bedtime    MEDICATIONS  (PRN):      Vitals:  T(C): 36.8 (04-07-23 @ 06:00), Max: 37 (04-06-23 @ 11:05)  HR: 82 (04-07-23 @ 06:00) (74 - 98)  BP: 113/73 (04-07-23 @ 06:00) (113/73 - 121/78)  RR: 19 (04-07-23 @ 06:00) (18 - 19)  SpO2: 96% (04-07-23 @ 06:00) (96% - 98%)      Neurologic Examination:      - Mental Status: Alert, awake, oriented to person, month, year, location, and situation; speech is fluent with and follows commands    - Cranial Nerves:  II: Visual fields are full to confrontation; pupils are equal, round, and reactive to light   III, IV, VI: Extraocular movements are intact without nystagmus  V: Facial sensation is intact in the V1-V3 distribution bilaterally  VII: Face is symmetric with normal eye closure and smile  VIII: Hearing is intact to conversation  IX, X: Uvula is midline and soft palate rises symmetrically  XII: Tongue protrudes in the midline    - Motor/Strength Testing:                                 Right           Left  Biceps                      5                 5  Triceps                     4+                5   (RUE limited by cramp in triceps)   Wrist Ext (radial)       4+                 5  Hand                 4+                 5  Pincer grasp             4+                 5    Hip Flex                   4+                 5  Knee Ext	      5                  5  Dorsiflex                 4+                  5  Plantarflex               5                  5    - Normal muscle bulk and tone throughout.    - Reflexes:   Bicep (C5/C6):                  R 3+ --- L 3+ (more brisk on R, pectoral reflex b/l)  Brachioradialis (C5/C6) :   R 3+ --- L 3+ (more brisk on R, Stringer b/l)  Patella (L3/L4) :                 R 4+ --- L 3+ (clonus R, suprapatellar L)  Ankle (S1) :                       R 4+ --- L 2+ (12-13 beats clonus, tested while supine)    - Plant responses down bilaterally.    - Sensory: Intact throughout to light touch x 4 extremities  - Coordination: Finger-nose-finger intact without ataxia or dysmetria.    - Romberg: negative.  - Gait: Hemiparetic gait.        Labs:                        14.3   18.15 )-----------( 466      ( 07 Apr 2023 06:34 )             43.2     04-07    137  |  99  |  18  ----------------------------<  138<H>  4.3   |  24  |  0.77    Ca    9.8      07 Apr 2023 06:34  Phos  3.3     04-07  Mg     2.2     04-07    TPro  8.1  /  Alb  4.6  /  TBili  0.4  /  DBili  x   /  AST  28  /  ALT  44  /  AlkPhos  49  04-06    CAPILLARY BLOOD GLUCOSE        LIVER FUNCTIONS - ( 06 Apr 2023 06:18 )  Alb: 4.6 g/dL / Pro: 8.1 g/dL / ALK PHOS: 49 U/L / ALT: 44 U/L / AST: 28 U/L / GGT: x              Radiology:  MRI BRAIN:  Multifocal T2 and FLAIR hyperintense lesions, many of which demonstrate   somewhat solid enhancement and restricted diffusion.    Some of the lesions are periventricular, ovoid in shape, and oriented in   a perpendicular fashion to the long axis of the ventricles. Others are   subcortical in nature.    Findings are suspicious for the presence of demyelinating disease.   Infectious and inflammatory processes are not entirely excluded.    MRI ORBITS:  No abnormal signal or enhancement within the optic.  Intraorbital contents unremarkable    MRI CERVICAL SPINE:  Abnormal intrinsic cord signal and enhancement within the right lateral   aspect of the cord at the T6 level, with minimal associated cord swelling.    Findings are suspicious for the presence of demyelinating disease.   Infectious and inflammatory processes are not entirely excluded.    MRI THORACIC SPINE:  Nonenhancing focus of T2 hyperintense signal within the mid and posterior   cord at the T9 level.    Findings are suspicious for the presence of demyelinating disease.  Infectious and inflammatory processes are not entirely excluded.

## 2023-04-07 NOTE — DISCHARGE NOTE PROVIDER - NSDCCPCAREPLAN_GEN_ALL_CORE_FT
PRINCIPAL DISCHARGE DIAGNOSIS  Diagnosis: Multiple sclerosis  Assessment and Plan of Treatment:      PRINCIPAL DISCHARGE DIAGNOSIS  Diagnosis: Multiple sclerosis  Assessment and Plan of Treatment: Patient initially came to the emergency department complaining of blurry vision, right arm and leg weakness leading to difficulty walking. She was recently seen and diagnosed with multiple sclerosis where you finished steriods but did not yet start long term treatment as it was deffered till after the honeymoon.    On examination your eye movement and light sensitivty were normal without any blindspots in your vision. Your strength in your right arm and leg were slightly weaker then your left, with some increase reflex on the right ankle as well. You also showed some difficulty walking when examined.  We also performed some blood testing checking for possible signs of infection, a few infectious viruses, testing your liver function, and, electrolytes which were all essentially normal except for your white blood cells (marker of inflammation) that was only slightly higher than normal. Imagining which on MRI of your brain and spinal cord showed multiple areas of inflammation in different parts of your brain and spinal cord. The MRI of your eyes were laregely normaly.  Overall the symptoms of your right sided weakness, increase reflex, and high WBC alongside the abnormal signals in your MRI, led to our diagnosis of another episode of your MS. MS is a disorder largely where you get an attack on parts of your nerve from your immune system which is what led to your difficulty with moving your right side of your body. We gave you steriods to help lower the inflammation which you then showed improvement in sysmptoms. We also will continue steriods at a lower dose once discharged to help improve the symptoms over time, alongside f/u with Dr. Talbert for more long term treatment options. For your eyes we consulted opthalmology which diagnosed you also with dry eye syndrome which we began treatment with eye drops which should also be continued at home as well.     PRINCIPAL DISCHARGE DIAGNOSIS  Diagnosis: Multiple sclerosis  Assessment and Plan of Treatment: You initially came to the emergency department with blurry vision, right arm and leg weakness leading to difficulty walking. You were recently seen and diagnosed with multiple sclerosis where you finished steriods, but did not yet start long term treatment as it was deferred until after the honeymoon.    On examination, your eye movement and light sensitivty were normal without any blindspots in your vision. Your strength in your right arm and leg were slightly weaker than your left.   Imaging of MRI of your brain and spinal cord showed multiple areas of inflammation in different parts of your brain and spinal cord. The MRI of your eyes were without concerning findigns.    You symptoms, physical examination findings, and MRI studies were consistent with multiple sclerosis (MS). You were treated with medicines called steroids. These are different than the steroids athletes take to build up muscle. They reduce the body's autoimmune response, so they can shorten the length of an attack. Your symptoms improved during your hospital admission. You will take a high dose of oral steroids on Sunday 4/9/23. You will then start your steroid taper regimen on 4/10/23, as directed on your prescription.  You will follow-up with your neurologist Dr. Nirali Talbert after your discharge.

## 2023-04-07 NOTE — DISCHARGE NOTE PROVIDER - HOSPITAL COURSE
HPI: 27-year-old right-handed female w/ PMHx multiple sclerosis (recently diagnosed 3/23/23, f/b Dr. Nirali Talbert) p/w b/l blurry vision, right hemiparesis a/w gait instability. In 2/2023, onset paresthesias in feet; seen by rheumatologist, started on prednisone taper w/ subsequent resolution. MRI brain w/wo 3/8/23 w/ scattered ovoid supratentorial lesions, periventricular and juxtacortical, non-enhancing; MRI cervical w/wo 3/17/23 w/o cord lesions; MRI thoracic w/wo 3/17/23- central posterior cord lesion at T9, enhancing. Planned for DMTs after honeymoon 4/7, but presenting to hospital 4/1 c/o bifrontal HA and b/l blurry vision; diagnosed w/ ocular migraine; discharged on Medrol Dosepak. On 4/2, onset right hemiparesis; admitted for MS management.     Inpatient imaging: MRI orbits/brain/C/T w/wo 4/5/23: no e/o optic neuritis; new and enhancing lesions L frontal CLARA, R frontal PV, L subcortical, L parietal and occipital; enhancing lesion right cervical hemicord at C6; chronic, stable lesion at T9.    Impression: (1) Bilateral blurry vision w/o clinical or radiographic evidence of optic neuritis; most likely due to dry eye syndrome in both eyes, improving s/p new glasses and eye drops. (2) New onset right hemiparesis localizable to right cervical hemicord demyelinating lesion due to multiple sclerosis, symptomatically improving on pulse steroids.    Hospital course:  Ophthalmology consult - dry eye syndrome, treat w/ artificial tears and eye ointment qHS  MRI w/wo of orbits, brain, cervical and thoracic spine (results above)  Methylprednisolone 1,000 mg QD for 5 days (4/5-4/8) -- last dose of methylprednisolone scheduled earlier for patient to be discharged home prior to the holidays at her request.    Plan:  Discharged on steroid taper   Follow up with Dr. Nirali Talbert outpatient for further management, including DMT  Outpatient physical therapy   HPI: 27-year-old right-handed female w/ PMHx multiple sclerosis (recently diagnosed 3/23/23, f/b Dr. Nirali Talbert) p/w b/l blurry vision, right hemiparesis a/w gait instability. In 2/2023, onset paresthesias in feet; seen by rheumatologist, started on prednisone taper w/ subsequent resolution. MRI brain w/wo 3/8/23 w/ scattered ovoid supratentorial lesions, periventricular and juxtacortical, non-enhancing; MRI cervical w/wo 3/17/23 w/o cord lesions; MRI thoracic w/wo 3/17/23- central posterior cord lesion at T9, enhancing. Planned for DMTs after honeymoon 4/7, but presenting to hospital 4/1 c/o bifrontal HA and b/l blurry vision; diagnosed w/ ocular migraine; discharged on Medrol Dosepak. On 4/2, onset right hemiparesis; admitted for MS management. Of note, patient's only home medication was OCP Junel.    Inpatient imaging: MRI orbits/brain/C/T w/wo 4/5/23: no e/o optic neuritis; new and enhancing lesions L frontal CLARA, R frontal PV, L subcortical, L parietal and occipital; enhancing lesion right cervical hemicord at C6; chronic, stable lesion at T9.    Impression: (1) Bilateral blurry vision w/o clinical or radiographic evidence of optic neuritis; most likely due to dry eye syndrome in both eyes, improving s/p new glasses and eye drops. (2) New onset right hemiparesis localizable to right cervical hemicord demyelinating lesion due to multiple sclerosis, symptomatically improving on pulse steroids.    Hospital course:  Ophthalmology consult - dry eye syndrome, treat w/ artificial tears and eye ointment qHS  MRI w/wo of orbits, brain, cervical and thoracic spine (results above)  Methylprednisolone 1,000 mg QD for 5 days (4/5-4/8) -- last dose of methylprednisolone scheduled earlier for patient to be discharged home prior to the holidays at her request.    Plan:  Discharged on steroid taper   Follow up with Dr. Nirali Talbert outpatient for further management, including DMT  Outpatient physical therapy   HPI: 27-year-old right-handed female w/ PMHx multiple sclerosis (recently diagnosed 3/23/23, f/b Dr. Nirali Talbert) p/w b/l blurry vision, right hemiparesis a/w gait instability. In 2/2023, onset paresthesias in feet; seen by rheumatologist, started on prednisone taper w/ subsequent resolution. MRI brain w/wo 3/8/23 w/ scattered ovoid supratentorial lesions, periventricular and juxtacortical, non-enhancing; MRI cervical w/wo 3/17/23 w/o cord lesions; MRI thoracic w/wo 3/17/23- central posterior cord lesion at T9, enhancing. Planned for DMTs after honeymoon 4/7, but presenting to hospital 4/1 c/o bifrontal HA and b/l blurry vision; diagnosed w/ ocular migraine; discharged on Medrol Dosepak. On 4/2, onset right hemiparesis; admitted for MS management. Of note, patient's only home medication was OCP Junel.    Inpatient imaging: MRI orbits/brain/C/T w/wo 4/5/23: no e/o optic neuritis; new and enhancing lesions L frontal CLARA, R frontal PV, L subcortical, L parietal and occipital; enhancing lesion right cervical hemicord at C6; chronic, stable lesion at T9.    Impression: (1) Bilateral blurry vision w/o clinical or radiographic evidence of optic neuritis; most likely due to dry eye syndrome in both eyes, improving s/p new glasses and eye drops. (2) New onset right hemiparesis localizable to right cervical hemicord demyelinating lesion due to multiple sclerosis, symptomatically improving on pulse steroids.    Hospital course:  Ophthalmology consult - dry eye syndrome, treat w/ artificial tears and eye ointment qHS  MRI w/wo of orbits, brain, cervical and thoracic spine (results above)  Methylprednisolone 1,000 mg QD for 5 days (4/5-4/8)       Plan:  High dose oral prednisone 1,250 mg x 1 on 4/9/23 at home  Discharged on steroid taper - 60 mg, 40 mg, 20 mg, 10 mg, 5mg, each for four days, then stop  Follow up with Dr. Nirali Talbert outpatient for further management, including DMT  Outpatient physical and occupational therapy   HPI: 27-year-old right-handed female w/ PMHx multiple sclerosis (recently diagnosed 3/23/23, f/b Dr. Nirali Talbert) p/w b/l blurry vision, right hemiparesis a/w gait instability. In 2/2023, onset paresthesias in feet; seen by rheumatologist, started on prednisone taper w/ subsequent resolution. MRI brain w/wo 3/8/23 w/ scattered ovoid supratentorial lesions, periventricular and juxtacortical, non-enhancing; MRI cervical w/wo 3/17/23 w/o cord lesions; MRI thoracic w/wo 3/17/23- central posterior cord lesion at T9, enhancing. Planned for DMTs after honeymoon 4/7, but presenting to hospital 4/1 c/o bifrontal HA and b/l blurry vision; diagnosed w/ ocular migraine; discharged on Medrol Dosepak. On 4/2, onset right hemiparesis; admitted for MS management. Of note, patient's only home medication was OCP Junel.    Inpatient imaging: MRI orbits/brain/C/T w/wo 4/5/23: no e/o optic neuritis; new and enhancing lesions L frontal CLARA, R frontal PV, L subcortical, L parietal and occipital; enhancing lesion right cervical hemicord at C6; chronic, stable lesion at T9.    Impression: (1) Bilateral blurry vision w/o clinical or radiographic evidence of optic neuritis; most likely due to dry eye syndrome in both eyes, improving s/p new glasses and eye drops. (2) New onset right hemiparesis localizable to right cervical hemicord demyelinating lesion due to multiple sclerosis, symptomatically improving on pulse steroids.    Hospital course:  Ophthalmology consult - dry eye syndrome, treat w/ artificial tears and eye ointment qHS  MRI w/wo of orbits, brain, cervical and thoracic spine (results above)  Methylprednisolone 1,000 mg QD for 5 days (4/5-4/8)       Plan:  High dose oral prednisone 1,250 mg x 1 on 4/9/23 at home (pantoprazole also sent to prevent GI side effects)  Discharged on steroid taper - 60 mg, 40 mg, 20 mg, 10 mg, 5mg, each for four days, then stop  Follow up with Dr. Nirali Talbert outpatient for further management, including DMT  Outpatient physical and occupational therapy

## 2023-04-07 NOTE — DISCHARGE NOTE PROVIDER - NSDCMRMEDTOKEN_GEN_ALL_CORE_FT
Junel 1/20 oral tablet: 1 orally once a day  Medrol Dosepak 4 mg oral tablet: 1 tab(s) orally once a day  Outpatient occupational therapy: Outpatient occupational therapy  Outpatient physical therapy: Outpatient physical therapy   Junel 1/20 oral tablet: 1 orally once a day  ocular lubricant ophthalmic ointment: 1 application to each affected eye once a day (at bedtime)  ocular lubricant ophthalmic solution: 1 drop(s) to each affected eye every 6 hours MDD: 1  Outpatient occupational therapy: Outpatient occupational therapy  Outpatient physical therapy: Outpatient physical therapy  predniSONE 10 mg oral tablet: 1 tab(s) orally once a day Take 6 tablets once a day by mouth (4/10-4/13)  Take 4 tablets once a day by mouth (4/14-4/17)  Take 2 tablets once a day by mouth (4/18-4/21)  Take 1 tablet once a day by mouth (4/22-4/25)  Take 0.5 tablet once a day by mouth (4/26-4/29)  predniSONE 50 mg oral tablet: 1,250 milligram(s) orally once   Junel 1/20 oral tablet: 1 orally once a day  ocular lubricant ophthalmic ointment: 1 application to each affected eye once a day (at bedtime)  ocular lubricant ophthalmic solution: 1 drop(s) to each affected eye every 6 hours MDD: 1  Outpatient occupational therapy: Outpatient occupational therapy  Outpatient physical therapy: Outpatient physical therapy  pantoprazole 40 mg oral delayed release tablet: 1 tab(s) orally once a day Prior to steroid dose in AM  predniSONE 10 mg oral tablet: 1 tab(s) orally once a day Take 6 tablets once a day by mouth (4/10-4/13)  Take 4 tablets once a day by mouth (4/14-4/17)  Take 2 tablets once a day by mouth (4/18-4/21)  Take 1 tablet once a day by mouth (4/22-4/25)  Take 0.5 tablet once a day by mouth (4/26-4/29)  predniSONE 50 mg oral tablet: 1,250 milligram(s) orally once

## 2023-04-08 ENCOUNTER — TRANSCRIPTION ENCOUNTER (OUTPATIENT)
Age: 28
End: 2023-04-08

## 2023-04-08 VITALS
SYSTOLIC BLOOD PRESSURE: 109 MMHG | OXYGEN SATURATION: 96 % | TEMPERATURE: 98 F | HEART RATE: 66 BPM | RESPIRATION RATE: 18 BRPM | DIASTOLIC BLOOD PRESSURE: 70 MMHG

## 2023-04-08 PROCEDURE — 85027 COMPLETE CBC AUTOMATED: CPT

## 2023-04-08 PROCEDURE — 87637 SARSCOV2&INF A&B&RSV AMP PRB: CPT

## 2023-04-08 PROCEDURE — 84100 ASSAY OF PHOSPHORUS: CPT

## 2023-04-08 PROCEDURE — 83735 ASSAY OF MAGNESIUM: CPT

## 2023-04-08 PROCEDURE — 80048 BASIC METABOLIC PNL TOTAL CA: CPT

## 2023-04-08 PROCEDURE — 85652 RBC SED RATE AUTOMATED: CPT

## 2023-04-08 PROCEDURE — 82607 VITAMIN B-12: CPT

## 2023-04-08 PROCEDURE — 99285 EMERGENCY DEPT VISIT HI MDM: CPT

## 2023-04-08 PROCEDURE — 97161 PT EVAL LOW COMPLEX 20 MIN: CPT

## 2023-04-08 PROCEDURE — 82652 VIT D 1 25-DIHYDROXY: CPT

## 2023-04-08 PROCEDURE — A9585: CPT

## 2023-04-08 PROCEDURE — 86140 C-REACTIVE PROTEIN: CPT

## 2023-04-08 PROCEDURE — G0378: CPT

## 2023-04-08 PROCEDURE — 72157 MRI CHEST SPINE W/O & W/DYE: CPT | Mod: MA

## 2023-04-08 PROCEDURE — 80053 COMPREHEN METABOLIC PANEL: CPT

## 2023-04-08 PROCEDURE — 70553 MRI BRAIN STEM W/O & W/DYE: CPT | Mod: MA

## 2023-04-08 PROCEDURE — 97165 OT EVAL LOW COMPLEX 30 MIN: CPT

## 2023-04-08 PROCEDURE — 70543 MRI ORBT/FAC/NCK W/O &W/DYE: CPT | Mod: MA

## 2023-04-08 PROCEDURE — 85025 COMPLETE CBC W/AUTO DIFF WBC: CPT

## 2023-04-08 PROCEDURE — 36415 COLL VENOUS BLD VENIPUNCTURE: CPT

## 2023-04-08 PROCEDURE — 72156 MRI NECK SPINE W/O & W/DYE: CPT | Mod: MA

## 2023-04-08 RX ADMIN — Medication 1 DROP(S): at 06:01

## 2023-04-08 RX ADMIN — Medication 50 MILLIGRAM(S): at 05:58

## 2023-04-08 NOTE — PROGRESS NOTE ADULT - ASSESSMENT
27-year-old right-handed female w/ PMHx multiple sclerosis (recently diagnosed 3/23/23, f/b Dr. Nirali Talbert) p/w b/l blurry vision, right hemiparesis a/w gait instability. In 2/2023, onset paresthesias in feet; seen by rheumatologist, started on prednisone taper w/ subsequent resolution. MRI brain w/wo 3/8/23 w/ scattered ovoid supratentorial lesions, periventricular and juxtacortical, non-enhancing; MRI cervical w/wo 3/17/23 w/o cord lesions; MRI thoracic w/wo 3/17/23- central posterior cord lesion at T9, enhancing. Planned for DMTs after honeymoon 4/7, but presenting to hospital 4/1 c/o bifrontal HA and b/l blurry vision; diagnosed w/ ocular migraine; discharged on Medrol Dosepak. On 4/2, onset right hemiparesis; admitted for MS management. Interval improvement in neuro exam.     Inpatient imaging: MRI orbits/brain/C/T w/wo 4/5/23: no e/o optic neuritis; new and enhancing lesions L frontal CLARA, R frontal PV, L subcortical, L parietal and occipital; enhancing lesion right cervical hemicord at C6; chronic, stable lesion at T9.    Impression: (1) Bilateral blurry vision w/o clinical or radiographic evidence of optic neuritis; most likely due to dry eye syndrome in both eyes, improving s/p new glasses and eye drops. (2) New onset right hemiparesis localizable to right cervical hemicord demyelinating lesion due to multiple sclerosis, symptomatically improving on pulse steroids.    Plan:   [/] Methylprednisolone 1,000 mg QD for 5 days (4/5-4/8)  [x] MRI w/wo of orbits, brain, cervical and thoracic spine (results above)  [x] Ophthalmology consult - dry eye syndrome, treat w/ artificial tears and eye ointment qHS  [x] Only home med is OCP Junel (ethinylestradiol / norethisterone), has her own, can take if she needs to.    Discharge planning:  - Physical and occupational therapy outpatient  - High dose oral prednisone 1,250 mg on 4/9 (to be taken at home)

## 2023-04-08 NOTE — DISCHARGE NOTE NURSING/CASE MANAGEMENT/SOCIAL WORK - PATIENT PORTAL LINK FT
You can access the FollowMyHealth Patient Portal offered by [facility] by registering at the following website: [email or web address]. By joining [employer]’s FollowMyHealth portal, you will also be able to view your health information using other applications (apps) compatible with our system.

## 2023-04-08 NOTE — DISCHARGE NOTE NURSING/CASE MANAGEMENT/SOCIAL WORK - NSDCPEFALRISK_GEN_ALL_CORE
For information on Fall & Injury Prevention, visit: [email or web address] OR  [email or web address] OR  https://www.cdc.gov/steadi/patient.html

## 2023-04-08 NOTE — PROGRESS NOTE ADULT - ATTENDING COMMENTS
26 yo female recently seen by me in clinic and diagnosed with relapsing multiple sclerosis (developed b/l foot numbness/paresthesias in 2/2023, resolved, MRI imaging consistent with MS with active lesion at T9) and currently underlying work up to initiate DMT, migraine headache, p/w 5 day hx of b/l blurry vision and 2 day hx of gait instability with right sided weakness.     ESR and CRP normal.   UA neg     MRI orbits, brain, C and T spine w/w/o completed, I personally reviewed scans, there is no evidence of optic neuritis, there are several new and enhancing lesions on brain MRI (at least 5 enhancing lesions- L frontal CLARA, R frontal PV, L subcortical, L parietal and occipital). There is also an enhancing lesion in right cervical hemicord at C6. Chronic lesion at T9 (stable).     Pt admitted to neurology floor for MS exacerbation and treatment with IV solumedrol.     Interim Hx- today is day 4/5 of solumedrol. She reports feeling better. No new or worsening symptoms. Eyes still feel blurry. Walking better and fine motor movements of right hand also better. She would like to be home tomorrow for [facility]. Mother, father and  at bedside.     On exam, there is no RAPD. No facial asymmetry, Motor strength UE 5/5 and LE 5/5 except R dorsiflexion 4+/5 (improved from 3/5 in ED). Increased tone in RLE. Diffusely brisk reflexes with sustained clonus at R ankle, babinski present on the R. On gait exa- wide based, R leg is spastic, drags R leg with steppage gait (improved). Sensations to LE intact UE and LE.     Imp/Plan:-                  R hemibody motor disturbance and gait instability- Multiple sclerosis exacerbation- multiple new/enh lesions on brain MRI, active right hemicord C6 lesion. Pt mostly likely symptomatic from cervical myelitis                 b/l blurry vision- likely dry eyes, not consistent with optic neuritis    s/p 4/5 days of IVMP w/ clinical improvement  Pt stable for discharge home with outpatient PT/OT  She will complete last day of high dose steroid tomorrow (prednisone 1250 mg QD), followed by prednisone taper- 60 mg x 4d > 40 mg x 4d > 20 mg x 4d > stop.   Pt has a follow up with me in 1 week.   Briefly reviewed DMT options, will email pt start forms. Given active disease and disease burden, would recommend highly efficacious treatment. PT not comfortable with injectables. She is JCV ab neg. Recommend Tysabri vs Ocrevus (pt will look into it and finalize by early next week)
See admission note for details
(1) Bilateral blurry vision w/o clinical or radiographic evidence of optic neuritis; most likely due to dry eye syndrome in both eyes, improving s/p new glasses and eye drops. (2) New onset right hemiparesis localizable to right cervical hemicord demyelinating lesion due to multiple sclerosis, symptomatically improving on pulse steroids.    Plan:   [/] Methylprednisolone 1,000 mg QD for 5 days (4/5-4/8) Day #3

## 2023-04-08 NOTE — PROGRESS NOTE ADULT - SUBJECTIVE AND OBJECTIVE BOX
SUBJECTIVE/INTERVAL HISTORY:  - Overall improved symptoms in RLE  - Able to text, walk    PAST MEDICAL & SURGICAL HISTORY:  Multiple sclerosis      No significant past surgical history        FAMILY HISTORY:  No pertinent family history in first degree relatives        MEDICATIONS (HOME):  Home Medications:  Junel 1/20 oral tablet: 1 orally once a day (05 Apr 2023 13:13)    MEDICATIONS  (STANDING):  artificial  tears Solution 1 Drop(s) Both EYES every 6 hours  enoxaparin Injectable 40 milliGRAM(s) SubCutaneous every 24 hours  petrolatum Ophthalmic Ointment 1 Application(s) Both EYES at bedtime    MEDICATIONS  (PRN):    ALLERGIES/INTOLERANCES:  Allergies  No Known Drug Allergies  Nuts (Anaphylaxis)  pesto (Anaphylaxis)    Intolerances    VITALS & EXAMINATION:  Vital Signs Last 24 Hrs  T(C): 36.5 (08 Apr 2023 06:31), Max: 36.9 (07 Apr 2023 13:05)  T(F): 97.7 (08 Apr 2023 06:31), Max: 98.4 (07 Apr 2023 13:05)  HR: 67 (08 Apr 2023 06:31) (67 - 101)  BP: 129/85 (08 Apr 2023 06:31) (110/74 - 129/85)  BP(mean): --  RR: 18 (08 Apr 2023 06:31) (17 - 18)  SpO2: 99% (08 Apr 2023 06:31) (96% - 99%)    Parameters below as of 08 Apr 2023 06:31  Patient On (Oxygen Delivery Method): room air        General:  Constitutional: normal weight Female, appears stated age, in no apparent distress including pain  Head: Normocephalic & atraumatic.    Neurological (>12):  MS: Awake, alert, oriented to person, place, situation, time. Normal affect. Follows all commands.    Language: Speech is clear, fluent with good repetition & comprehension     CNs: EOMI. No diplopia. VFF. No facial asymmetry b/l, full eye closure strength b/l.     Motor: Normal muscle bulk & tone. No noticeable tremor or seizure. No pronator drift.              Deltoid	Biceps	   R	5	5	5		  L	5	5	5	    	H-Flex	K-Flex	K-Ext	D-Flex	P-Flex  R	5	5	5	4+	5		   L	5	5	5	5	5		     Sensation: Intact to LT/PP/Temp/Vibration/Position b/l throughout.     Cortical: Extinction on DSS (neglect): none    Reflexes: R foot sustained clonus     Plantar Resp  R	Up  L	Down     Coordination: intact rapid-alt movements. Mild dysmetria in RUE    Gait: Wide based, stiff gait    LABORATORY:  CBC                       14.3   18.15 )-----------( 466      ( 07 Apr 2023 06:34 )             43.2     Chem 04-07    137  |  99  |  18  ----------------------------<  138<H>  4.3   |  24  |  0.77    Ca    9.8      07 Apr 2023 06:34  Phos  3.3     04-07  Mg     2.2     04-07      LFTs   Coagulopathy   Lipid Panel   A1c   Cardiac enzymes     U/A   CSF  Immunological  Other    STUDIES & IMAGING:  Studies (EKG, EEG, EMG, etc):     Radiology (XR, CT, MR, U/S, TTE/CLARY):    MRI BRAIN:  Multifocal T2 and FLAIR hyperintense lesions, many of which demonstrate   somewhat solid enhancement and restricted diffusion.    Some of the lesions are periventricular, ovoid in shape, and oriented in   a perpendicular fashion to the long axis of the ventricles. Others are   subcortical in nature.    Findings are suspicious for the presence of demyelinating disease.   Infectious and inflammatory processes are not entirely excluded.    MRI ORBITS:  No abnormal signal or enhancement within the optic.  Intraorbital contents unremarkable    MRI CERVICAL SPINE:  Abnormal intrinsic cord signal and enhancement within the right lateral   aspect of the cord at the T6 level, with minimal associated cord swelling.    Findings are suspicious for the presence of demyelinating disease.   Infectious and inflammatory processes are not entirely excluded.    MRI THORACIC SPINE:  Nonenhancing focus of T2 hyperintense signal within the mid and posterior   cord at the T9 level.    Findings are suspicious for the presence of demyelinating disease.  Infectious and inflammatory processes are not entirely excluded.

## 2023-04-09 RX ORDER — PANTOPRAZOLE SODIUM 20 MG/1
1 TABLET, DELAYED RELEASE ORAL
Qty: 30 | Refills: 0
Start: 2023-04-09 | End: 2023-05-08

## 2023-04-11 ENCOUNTER — NON-APPOINTMENT (OUTPATIENT)
Age: 28
End: 2023-04-11

## 2023-04-12 RX ORDER — NORETHINDRONE AND ETHINYL ESTRADIOL 0.4-0.035
1 KIT ORAL
Refills: 0 | DISCHARGE

## 2023-04-14 ENCOUNTER — APPOINTMENT (OUTPATIENT)
Dept: OPHTHALMOLOGY | Facility: CLINIC | Age: 28
End: 2023-04-14
Payer: COMMERCIAL

## 2023-04-14 ENCOUNTER — NON-APPOINTMENT (OUTPATIENT)
Age: 28
End: 2023-04-14

## 2023-04-14 PROCEDURE — 92004 COMPRE OPH EXAM NEW PT 1/>: CPT

## 2023-04-14 PROCEDURE — 92083 EXTENDED VISUAL FIELD XM: CPT

## 2023-04-17 ENCOUNTER — APPOINTMENT (OUTPATIENT)
Dept: NEUROLOGY | Facility: CLINIC | Age: 28
End: 2023-04-17
Payer: COMMERCIAL

## 2023-04-17 VITALS
HEIGHT: 66 IN | HEART RATE: 85 BPM | SYSTOLIC BLOOD PRESSURE: 123 MMHG | DIASTOLIC BLOOD PRESSURE: 85 MMHG | BODY MASS INDEX: 19.29 KG/M2 | WEIGHT: 120 LBS

## 2023-04-17 PROCEDURE — 99214 OFFICE O/P EST MOD 30 MIN: CPT

## 2023-04-17 RX ORDER — METHYLPREDNISOLONE 4 MG/1
4 TABLET ORAL
Qty: 21 | Refills: 0 | Status: DISCONTINUED | COMMUNITY
Start: 2023-04-01

## 2023-04-17 NOTE — HISTORY OF PRESENT ILLNESS
[FreeTextEntry1] : INTERIM HX 04/17/2023: Pt admitted to [facility] 4/5/-4/8, for MS relapse- Right RUE/RLE clumsiness, R foot drop with gait disturbance. She was found to have new/active brain lesions and active right hemicord C5-C6 lesion.\par I saw pt in hospital. s/p 4 days high dose IVMP and one day high dose oral prednisone + taper\par Signed start form for Ocrevus\par Of note, she also has a hx of migraine headaches, complained of b/l blurry vision in hospital, seen by ophtho, diagnosed with dry eyes. \par Saw Dr. Robert Benson 4/14, eye exam normal, she thought vision issue was related to migraines. ? Acephalgic migraine.\par \par Walking is better. Hand is back 100%. Vision is clearer. On prednisone taper, on 40 mg- will continue taper. She got a call from [employer] [facility])- approval pending. Occasional tingling and spasms in legs. Complains of brain fog. \par \par -----------------------------------------\par HPI (initial visit Mar 23, 2023)- REANNA HOUSTON is a 27 year old woman referred for abnormal brain MRI and to rule out MS.\par \par She has been followed Dr. Santosh Rodriguez, neurologist. \par \par About 1 month ago (feb), flown back from [place], and woke up with tingling in feet, constant. Two weeks prior, she had an URI. She was seen at urgent care and seen at [facility], ruled out DVT, referred to neurology. Seen by sisters rheumatologist, had blood work up, took prednisone taper, symptoms have now resolved 100%. Saw Dr. Rodriguez 3/2/2023. NO prior hx of transient neurological symptoms. \par \par Labs by Rheumatologist, Dr. Slick Garay- B12 380, Vitamin D 47.2, C3 and C4 normal, PTH normal, thyroglobulin ab neg, quant TB neg,\par \par Recent MRI imaging from [facility] [place] [facility]\""-\par MRI brain w/w/o 3/8/2023- scattered ovoid supratentorial lesions, periventricular and juxtacortical, non enhancing. No definite CC lesion. \par MRI C spine w/w/o 3/17/2023- no cord lesions\par MRI T spine w/w/o 3/17/2023- central posterior cord lesion at T9, enhancing

## 2023-04-17 NOTE — REVIEW OF SYSTEMS
Patient called and said that he got a missed call. Please reach him back at [phone]. Thank you.   [As Noted in HPI] : as noted in HPI [Negative] : Heme/Lymph

## 2023-04-17 NOTE — PHYSICAL EXAM
[FreeTextEntry1] : UE and LE motor strength 5/5\par No dysmetria on FNF b/l\par + Dysmetria on HTS on the right\par Sensations to LT intact UE and LE\par Reflexes 3+ biceps and patellars, 3+ L ankle and 4+ R ankle\par + babinski on the right\par Slightly wide based gait with mild spasticity of R leg.

## 2023-04-17 NOTE — DATA REVIEWED
[de-identified] : \par MRI orbits/ brain, C/T spine w/w/o contrast 4/5/2023- \par I personally reviewed scans,\par there is no evidence of optic neuritis, there are several new and enhancing\par lesions on brain MRI (at least 5 enhancing lesions- L frontal CLARA, R frontal PV,\par L subcortical, L parietal and occipital). There is also an enhancing lesion in\par right cervical hemicord at C6. Chronic lesion at T9 (stable).\par \par \par \par [facility] MRI brain w/w/o 3/8/2023- scattered ovoid supratentorial lesions, periventricular and juxtacortical, non enhancing. No definite CC lesion. \par MRI C spine w/w/o 3/17/2023- no cord lesions\par MRI T spine w/w/o 3/17/2023- central posterior cord lesion at T9, enhancing

## 2023-04-17 NOTE — ASSESSMENT
[FreeTextEntry1] : Assessment/Plan:\par  27 year old female with 2 week hx of b/l foot numbness (2/2023self resolved) and MRI brain/spine showing lesions characteristic for multiple sclerosis and meeting DIS and DIT based on 2017 [employer] criteria for diagnosis of MS (ovoid, periventricular, juxtacortical, enhancing short segment cord lesion- active lesion at T9) with recent relapse in 4/2023 with RUE/RLE weakness and gait disturbance with several new/active brain lesions and right hemicord C5/C6 lesion, s/p IVMP with significant improvement in symptoms. \par \par I discussed the pathophysiology of Multiple Sclerosis, its disease course and management. We discussed the different options for disease modifying therapy. I explained to her that the goal of treatment is to prevent any new clinical relapses, new lesions on MRI scans and to slow down disease progression/disability. In addition to discussing disease modifying therapies, we reviewed available therapies for symptomatic management for spasticity, neuropathic pain, bladder symptoms, fatigue etc. I also stressed on the importance of healthy eating habits, routine physical therapy and vitamin D supplementation.\par \par \par Relapsing Multiple Sclerosis (dx'd 3/2023), pending start of DMT\par Plan: \par 1. Diagnostic Plan/Imaging: Will plan to repeat MRI brain, C/T spine w.w.o contrast 3 months after the start of DMT\par \par 2. Disease Modifying therapy plan: \par MS DMT: Start Ocrevus\par -Ocrevus screening labs ordered\par -Monitoring Ocrevus labs (CBC with diff, LFT) every 6 months while on therapy\par For Ocrevus we discussed the most commonly seen potential side effects in the clinical trials of upper respiratory infections, infusion reactions, skin infections, and the fact that there were 6 cases of breast cancer in the Ocrevus treated group as compared to none in the Rebif and placebo groups.  We discussed the breast cancer data in details as it is discussed in the Ocrevus prescribing information. \par Counseled on avoiding vaccination with live virus vaccine\par \par \par 3. Symptomatic therapy plan:\par () Vitamin D3 and B12 supplementations \par () Continue PT and OT\par () Spasticity- Stretching exercises. On magnesium and tonic water. \par \par Return to clinic 3 months\par \par The above plan was discussed with REANNA HOUSTON in great detail.  REANNA HOUSTON verbalized understanding and agrees with plan as detailed above. Patient was provided education and counselling on current diagnosis/symptoms. She was advised to call our clinic at [phone] for any new or worsening symptoms, or with any questions or concerns. In case of acute onset of neurological symptoms or worsening presentation, patient was advised to present to nearest emergency room for further evaluation. REANNA HOUSTON expressed understanding and all her questions/concerns were addressed.\par \par Nirali Talbert M.D\par

## 2023-04-21 PROBLEM — G35 MULTIPLE SCLEROSIS: Chronic | Status: ACTIVE | Noted: 2023-04-01

## 2023-04-27 ENCOUNTER — NON-APPOINTMENT (OUTPATIENT)
Age: 28
End: 2023-04-27

## 2023-04-27 ENCOUNTER — APPOINTMENT (OUTPATIENT)
Dept: OPHTHALMOLOGY | Facility: CLINIC | Age: 28
End: 2023-04-27
Payer: COMMERCIAL

## 2023-04-27 PROCEDURE — 92012 INTRM OPH EXAM EST PATIENT: CPT

## 2023-05-02 ENCOUNTER — NON-APPOINTMENT (OUTPATIENT)
Age: 28
End: 2023-05-02

## 2023-05-03 RX ORDER — OCRELIZUMAB 300 MG/10ML
300 INJECTION INTRAVENOUS
Qty: 2 | Refills: 1 | Status: ACTIVE | COMMUNITY
Start: 2023-04-24 | End: 1900-01-01

## 2023-10-17 ENCOUNTER — APPOINTMENT (OUTPATIENT)
Dept: NEUROLOGY | Facility: CLINIC | Age: 28
End: 2023-10-17
Payer: COMMERCIAL

## 2023-10-17 PROCEDURE — 99213 OFFICE O/P EST LOW 20 MIN: CPT | Mod: 95

## 2023-10-17 RX ORDER — PANTOPRAZOLE 40 MG/1
40 TABLET, DELAYED RELEASE ORAL
Qty: 30 | Refills: 0 | Status: DISCONTINUED | COMMUNITY
Start: 2023-04-08 | End: 2023-10-17

## 2023-10-17 RX ORDER — BACLOFEN 10 MG/1
10 TABLET ORAL
Qty: 60 | Refills: 3 | Status: DISCONTINUED | COMMUNITY
Start: 2023-05-02 | End: 2023-10-17

## 2023-10-17 RX ORDER — PREDNISONE 50 MG/1
50 TABLET ORAL
Qty: 25 | Refills: 0 | Status: DISCONTINUED | COMMUNITY
Start: 2023-04-07 | End: 2023-10-17

## 2023-10-17 RX ORDER — PREDNISONE 20 MG/1
20 TABLET ORAL DAILY
Qty: 30 | Refills: 0 | Status: DISCONTINUED | COMMUNITY
Start: 2023-04-07 | End: 2023-10-17

## 2023-10-18 RX ORDER — OCRELIZUMAB 300 MG/10ML
300 INJECTION INTRAVENOUS DAILY
Qty: 2 | Refills: 2 | Status: ACTIVE | COMMUNITY
Start: 2023-04-24 | End: 1900-01-01

## 2023-11-09 LAB
25(OH)D3 SERPL-MCNC: 40 NG/ML
ALBUMIN SERPL ELPH-MCNC: 4.7 G/DL
ALP BLD-CCNC: 66 U/L
ALT SERPL-CCNC: 21 U/L
ANION GAP SERPL CALC-SCNC: 13 MMOL/L
AST SERPL-CCNC: 16 U/L
BASOPHILS # BLD AUTO: 0.03 K/UL
BASOPHILS NFR BLD AUTO: 0.3 %
BILIRUB SERPL-MCNC: 0.2 MG/DL
BUN SERPL-MCNC: 13 MG/DL
CALCIUM SERPL-MCNC: 9.9 MG/DL
CHLORIDE SERPL-SCNC: 102 MMOL/L
CO2 SERPL-SCNC: 24 MMOL/L
CREAT SERPL-MCNC: 0.87 MG/DL
EGFR: 93 ML/MIN/1.73M2
EOSINOPHIL # BLD AUTO: 0.04 K/UL
EOSINOPHIL NFR BLD AUTO: 0.4 %
GLUCOSE SERPL-MCNC: 82 MG/DL
HCT VFR BLD CALC: 42.8 %
HGB BLD-MCNC: 13.7 G/DL
IMM GRANULOCYTES NFR BLD AUTO: 0.4 %
LYMPHOCYTES # BLD AUTO: 2.06 K/UL
LYMPHOCYTES NFR BLD AUTO: 19 %
MAN DIFF?: NORMAL
MCHC RBC-ENTMCNC: 28.7 PG
MCHC RBC-ENTMCNC: 32 GM/DL
MCV RBC AUTO: 89.5 FL
MONOCYTES # BLD AUTO: 0.78 K/UL
MONOCYTES NFR BLD AUTO: 7.2 %
NEUTROPHILS # BLD AUTO: 7.88 K/UL
NEUTROPHILS NFR BLD AUTO: 72.7 %
PLATELET # BLD AUTO: 433 K/UL
POTASSIUM SERPL-SCNC: 4.3 MMOL/L
PROT SERPL-MCNC: 7.3 G/DL
RBC # BLD: 4.78 M/UL
RBC # FLD: 13.1 %
SODIUM SERPL-SCNC: 139 MMOL/L
WBC # FLD AUTO: 10.83 K/UL

## 2024-02-07 ENCOUNTER — APPOINTMENT (OUTPATIENT)
Dept: NEUROLOGY | Facility: CLINIC | Age: 29
End: 2024-02-07

## 2024-02-09 ENCOUNTER — APPOINTMENT (OUTPATIENT)
Dept: MRI IMAGING | Facility: CLINIC | Age: 29
End: 2024-02-09
Payer: COMMERCIAL

## 2024-02-09 PROCEDURE — 70553 MRI BRAIN STEM W/O & W/DYE: CPT

## 2024-02-09 PROCEDURE — 72156 MRI NECK SPINE W/O & W/DYE: CPT

## 2024-02-09 PROCEDURE — A9585: CPT | Mod: JW

## 2024-02-09 PROCEDURE — 76377 3D RENDER W/INTRP POSTPROCES: CPT

## 2024-02-09 PROCEDURE — A9585: CPT

## 2024-02-12 PROCEDURE — 72157 MRI CHEST SPINE W/O & W/DYE: CPT

## 2024-02-14 ENCOUNTER — NON-APPOINTMENT (OUTPATIENT)
Age: 29
End: 2024-02-14

## 2024-02-23 ENCOUNTER — APPOINTMENT (OUTPATIENT)
Dept: NEUROLOGY | Facility: CLINIC | Age: 29
End: 2024-02-23

## 2024-02-27 ENCOUNTER — APPOINTMENT (OUTPATIENT)
Dept: NEUROLOGY | Facility: CLINIC | Age: 29
End: 2024-02-27
Payer: COMMERCIAL

## 2024-02-27 DIAGNOSIS — G35 MULTIPLE SCLEROSIS: ICD-10-CM

## 2024-02-27 PROCEDURE — 99214 OFFICE O/P EST MOD 30 MIN: CPT

## 2024-02-27 NOTE — HISTORY OF PRESENT ILLNESS
[FreeTextEntry1] : INTERIM HX 02/27/2024: [This is a telehealth 2-way video visit] reviewed MR imaging from 2/9 (brain and C spine) and 2/12 (T spine). No enhancing lesions Brain MR shows improvement in several supratentorial lesions - now less conspicuous. NEW brain lesion in L brachium pontis (compared w/ 4/2023 scan) C spine STABLE right C6 lesion (less conspicuous, no cord expansion) T spine with stable T9/T10 cord lesion, and ? NEW right T6 cord lesion (not clearly visible on review and only seen on axial, ? artifact)  Overall, most of the brain and cord lesions appear less conspicuous with a NEW lesion in L brachium pontis (non enh). This could have occurred sometime before the start of Ocrevus (between 4/2023 and 5/2023), ara given pt had a very active disease around that time.  Plan to repeat MR's in 6 months. No new clinical symptoms.  INTERIM HX 10/17/2023: - This is a telehealth 2-way video visit- Pt at home.  s/p ocrevus loading doses May 2023. It went well.  Doing well. By June, felt back to herself.  Walking back to normal./ Completed PT.  INTERIM HX 04/17/2023: Pt admitted to [facility] 4/5/-4/8, for MS relapse- Right RUE/RLE clumsiness, R foot drop with gait disturbance. She was found to have new/active brain lesions and active right hemicord C5-C6 lesion. I saw pt in hospital. s/p 4 days high dose IVMP and one day high dose oral prednisone + taper Signed start form for Ocrevus Of note, she also has a hx of migraine headaches, complained of b/l blurry vision in hospital, seen by ophtho, diagnosed with dry eyes.  Saw Dr. Robert Benson 4/14, eye exam normal, she thought vision issue was related to migraines. ? Acephalgic migraine.  Walking is better. Hand is back 100%. Vision is clearer. On prednisone taper, on 40 mg- will continue taper. She got a call from [employer])- approval pending. Occasional tingling and spasms in legs. Complains of brain fog.   ----------------------------------------- HPI (initial visit Mar 23, 2023)- REANNA HOUSTON is a 27 year old woman referred for abnormal brain MRI and to rule out MS.  She has been followed Dr. Santosh Rodriguez, neurologist.   About 1 month ago (feb), flown back from [place], and woke up with tingling in feet, constant. Two weeks prior, she had an URI. She was seen at urgent care and seen at [facility], ruled out DVT, referred to neurology. Seen by sisters rheumatologist, had blood work up, took prednisone taper, symptoms have now resolved 100%. Saw Dr. Rodriguez 3/2/2023. NO prior hx of transient neurological symptoms.   Labs by Rheumatologist, Dr. Slick Garay- B12 380, Vitamin D 47.2, C3 and C4 normal, PTH normal, thyroglobulin ab neg, quant TB neg,  Recent MRI imaging from [facility]- MRI brain w/w/o 3/8/2023- scattered ovoid supratentorial lesions, periventricular and juxtacortical, non enhancing. No definite CC lesion.  MRI C spine w/w/o 3/17/2023- no cord lesions MRI T spine w/w/o 3/17/2023- central posterior cord lesion at T9, enhancing

## 2024-02-27 NOTE — DATA REVIEWED
[de-identified] :  MRI orbits/ brain, C/T spine w/w/o contrast 4/5/2023-  I personally reviewed scans, there is no evidence of optic neuritis, there are several new and enhancing lesions on brain MRI (at least 5 enhancing lesions- L frontal CLARA, R frontal PV, L subcortical, L parietal and occipital). There is also an enhancing lesion in right cervical hemicord at C6. Chronic lesion at T9 (stable).    Recent MRI imaging from [facility]- MRI brain w/w/o 3/8/2023- scattered ovoid supratentorial lesions, periventricular and juxtacortical, non enhancing. No definite CC lesion.  MRI C spine w/w/o 3/17/2023- no cord lesions MRI T spine w/w/o 3/17/2023- central posterior cord lesion at T9, enhancing

## 2024-02-27 NOTE — ASSESSMENT
[FreeTextEntry1] : Assessment/Plan:  28 year old female with Relapsing Multiple Sclerosis (dx'd 3/2023), on Ocrevus since 5/2023. Clinically stable.  Relapses: 2/2023- b/l foot numbness, +DIS and DIT (active T9 cord lesion) 4/2023- R hemiparesis (new/active brain lesions, active C5-6 lesion)   Post Ocrevus follow up MR scans completed 2/2024- most of the brain and cord lesions appear less conspicuous with a NEW lesion in L brachium pontis (non enh). This could have occurred sometime before the start of Ocrevus (between 4/2023 and 5/2023), ara given pt had a very active disease around that time. She remains clinically stable.   Plan to repeat MR's in 6 months.   Plan: 1. Diagnostic Plan/Imaging: Will plan to repeat MRI brain, C/T spine w/w/o contrast 8/2024 for new baseline - order at next visit   2. Disease Modifying therapy plan: Continue Ocrelizumab 600 mg q6 monthly infusion Ocrelizumab labs every 6 months (CBC with diff, LFT, BUN, Creatinine) - order at next visit   3. Symptomatic therapy plan: () Vitamin D3 and B12 supplementations () Spasticity- Stretching exercises. On magnesium and tonic water PRN.   Return to clinic - RPA scheduled May.     The above plan was discussed with REANNA HOUSTON in great detail. REANNA HOUSTON verbalized understanding and agrees with plan as detailed above. Patient was provided education and counselling on current diagnosis/symptoms. She was advised to call our clinic at [phone] for any new or worsening symptoms, or with any questions or concerns. REANNA HOUSTON expressed understanding and all her questions/concerns were addressed.    Nirali Talbert M.D.

## 2024-03-06 ENCOUNTER — NON-APPOINTMENT (OUTPATIENT)
Age: 29
End: 2024-03-06

## 2024-04-05 ENCOUNTER — NON-APPOINTMENT (OUTPATIENT)
Age: 29
End: 2024-04-05

## 2024-05-08 ENCOUNTER — APPOINTMENT (OUTPATIENT)
Dept: NEUROLOGY | Facility: CLINIC | Age: 29
End: 2024-05-08

## 2024-11-25 NOTE — ED ADULT NURSE NOTE - CAS TRG GEN SKIN CONDITION
Verbal/written post procedure instructions were given to patient/caregiver./Instructed patient/caregiver to follow-up with primary care physician./Instructed patient/caregiver regarding signs and symptoms of infection./Keep the cast/splint/dressing clean and dry.
Warm

## 2025-06-12 NOTE — PROGRESS NOTE ADULT - PROVIDER SPECIALTY LIST ADULT
Ophthalmology
Neurology
- Continue home Ezetimibe  - Consider outpatient Lipid panel and Lp(a) assessment with cardiology
- Continue home Buproprion  - Continue home PRN clonazepam  - Continue home Ambien as PRN